# Patient Record
Sex: MALE | Race: WHITE | NOT HISPANIC OR LATINO | Employment: OTHER | URBAN - METROPOLITAN AREA
[De-identification: names, ages, dates, MRNs, and addresses within clinical notes are randomized per-mention and may not be internally consistent; named-entity substitution may affect disease eponyms.]

---

## 2023-08-26 PROBLEM — M06.9 RHEUMATOID ARTHRITIS (MULTI): Status: ACTIVE | Noted: 2023-08-26

## 2023-08-26 PROBLEM — I10 ESSENTIAL (PRIMARY) HYPERTENSION: Status: ACTIVE | Noted: 2023-08-26

## 2023-08-26 PROBLEM — M54.31 SCIATICA OF RIGHT SIDE: Status: ACTIVE | Noted: 2023-08-26

## 2023-08-26 PROBLEM — Z96.41 PRESENCE OF INSULIN PUMP (EXTERNAL) (INTERNAL): Status: ACTIVE | Noted: 2023-08-26

## 2023-08-26 PROBLEM — Z86.39 HISTORY OF DIABETES MELLITUS: Status: ACTIVE | Noted: 2023-08-26

## 2023-08-26 PROBLEM — E10.42 TYPE 1 DIABETES MELLITUS WITH DIABETIC POLYNEUROPATHY (MULTI): Status: ACTIVE | Noted: 2023-08-26

## 2023-08-26 PROBLEM — E78.2 MIXED HYPERLIPIDEMIA: Status: ACTIVE | Noted: 2023-08-26

## 2023-08-26 RX ORDER — LANCETS 26 GAUGE
EACH MISCELLANEOUS
COMMUNITY
Start: 2020-04-14

## 2023-08-26 RX ORDER — METOPROLOL TARTRATE 25 MG/1
1 TABLET, FILM COATED ORAL DAILY
COMMUNITY

## 2023-08-26 RX ORDER — INSULIN DEGLUDEC 100 U/ML
120 INJECTION, SOLUTION SUBCUTANEOUS DAILY
COMMUNITY
Start: 2023-02-17 | End: 2024-05-03 | Stop reason: SDUPTHER

## 2023-08-26 RX ORDER — ALLOPURINOL 300 MG/1
0.5 TABLET ORAL DAILY
COMMUNITY

## 2023-08-26 RX ORDER — INSULIN GLARGINE 100 [IU]/ML
INJECTION, SOLUTION SUBCUTANEOUS
COMMUNITY
End: 2023-10-27 | Stop reason: WASHOUT

## 2023-08-26 RX ORDER — METHOTREXATE 2.5 MG/1
10 TABLET ORAL
COMMUNITY

## 2023-08-26 RX ORDER — FOLIC ACID 20 MG
CAPSULE ORAL
COMMUNITY

## 2023-08-26 RX ORDER — INSULIN GLARGINE 100 [IU]/ML
60 INJECTION, SOLUTION SUBCUTANEOUS 2 TIMES DAILY
COMMUNITY
End: 2023-10-27 | Stop reason: WASHOUT

## 2023-08-26 RX ORDER — INSULIN LISPRO 100 [IU]/ML
INJECTION, SOLUTION INTRAVENOUS; SUBCUTANEOUS
COMMUNITY
End: 2024-03-14

## 2023-08-26 RX ORDER — BRIMONIDINE TARTRATE 1.5 MG/ML
1 SOLUTION/ DROPS OPHTHALMIC 2 TIMES DAILY
COMMUNITY

## 2023-08-26 RX ORDER — LATANOPROST 50 UG/ML
1 SOLUTION/ DROPS OPHTHALMIC NIGHTLY
COMMUNITY

## 2023-08-26 RX ORDER — BLOOD SUGAR DIAGNOSTIC
STRIP MISCELLANEOUS
COMMUNITY
End: 2024-01-17 | Stop reason: SDUPTHER

## 2023-08-26 RX ORDER — TOCILIZUMAB 180 MG/ML
INJECTION, SOLUTION SUBCUTANEOUS
COMMUNITY

## 2023-08-26 RX ORDER — FOLIC ACID 1 MG/1
1 TABLET ORAL DAILY
COMMUNITY

## 2023-10-01 DIAGNOSIS — E11.69 TYPE 2 DIABETES MELLITUS WITH OTHER SPECIFIED COMPLICATION, WITH LONG-TERM CURRENT USE OF INSULIN (MULTI): Primary | ICD-10-CM

## 2023-10-01 DIAGNOSIS — Z79.4 TYPE 2 DIABETES MELLITUS WITH OTHER SPECIFIED COMPLICATION, WITH LONG-TERM CURRENT USE OF INSULIN (MULTI): Primary | ICD-10-CM

## 2023-10-02 RX ORDER — PEN NEEDLE, DIABETIC 31 GX5/16"
NEEDLE, DISPOSABLE MISCELLANEOUS
Qty: 500 EACH | Refills: 1 | Status: SHIPPED | OUTPATIENT
Start: 2023-10-02 | End: 2024-03-29

## 2023-10-27 ENCOUNTER — CLINICAL SUPPORT (OUTPATIENT)
Dept: ENDOCRINOLOGY | Facility: CLINIC | Age: 68
End: 2023-10-27
Payer: MEDICARE

## 2023-10-27 VITALS
SYSTOLIC BLOOD PRESSURE: 133 MMHG | HEART RATE: 82 BPM | WEIGHT: 249.8 LBS | DIASTOLIC BLOOD PRESSURE: 88 MMHG | BODY MASS INDEX: 36.89 KG/M2

## 2023-10-27 DIAGNOSIS — I10 ESSENTIAL (PRIMARY) HYPERTENSION: ICD-10-CM

## 2023-10-27 DIAGNOSIS — E78.2 MIXED HYPERLIPIDEMIA: ICD-10-CM

## 2023-10-27 DIAGNOSIS — E10.42 TYPE 1 DIABETES MELLITUS WITH DIABETIC POLYNEUROPATHY (MULTI): Primary | ICD-10-CM

## 2023-10-27 LAB — POC HEMOGLOBIN A1C: 6.6 % (ref 4.2–6.5)

## 2023-10-27 PROCEDURE — 99214 OFFICE O/P EST MOD 30 MIN: CPT | Performed by: INTERNAL MEDICINE

## 2023-10-27 PROCEDURE — 83036 HEMOGLOBIN GLYCOSYLATED A1C: CPT | Performed by: INTERNAL MEDICINE

## 2023-10-27 PROCEDURE — 95251 CONT GLUC MNTR ANALYSIS I&R: CPT | Performed by: INTERNAL MEDICINE

## 2023-10-27 NOTE — PROGRESS NOTES
"69 yo with Diabetes 1 (dx 2003), HTN, dyslipidemia previously of Dr. Arroyo's practice. A1c was 6.7%, today 6.6%. Pt is testing sugars 4-5 times per day using dexcom. Pt is having low sugars 0-1 times/week. Pt has his dexcom with him today. Pt is following a carb controlled diet and knows reasonable carb allowances. Pt is able to afford their medications. Pt is not all that active.         Pt is taking tresiba 60 units bid and humalog 20-30 at meals (was 40u), ozempic 0.5mg weekly (started last visit, some gi sx - bloated/fullness following occ meal).          Pt taking toprol 25mg bid for htn.         Pt is not on a statin, states feeling achy/weak when tried in the past.         30 day dexcom data: 89% in range, 1% low, pattern: mid 100's through most of the day, occ excursions after meal.      Objective     /88   Pulse 82   Wt 113 kg (249 lb 12.8 oz)   BMI 36.89 kg/m²        Lab Results   Component Value Date    HGBA1C 6.6 (A) 10/27/2023       Current Outpatient Medications on File Prior to Visit   Medication Sig Dispense Refill    allopurinol (Zyloprim) 300 mg tablet 0.5 tablets (150 mg) once daily.      Autolet lancing device Use to test blood sugars 5 times per day. Long term current use of insulin Dx: E 10.42 for 90 days      BD Ultra-Fine Short Pen Needle 31 gauge x 5/16\" needle USE 5 TIMES A DAY AS DIRECTED 500 each 1    brimonidine (AlphaGAN P) 0.15 % ophthalmic solution Administer 1 drop into the right eye 2 times a day.      folic acid (Folvite) 1 mg tablet Take 1 tablet (1 mg) by mouth once daily.      folic acid 20 mg capsule Take by mouth.  Folic Acid CAPS Refills: 0 Active      insulin degludec (Tresiba FlexTouch U-100) 100 unit/mL (3 mL) injection Inject 120 Units under the skin once daily.      insulin lispro (HumaLOG KwikPen Insulin) 100 unit/mL injection INJECT UP  UNITS DAILY AS DIRECTED for 88      latanoprost (Xalatan) 0.005 % ophthalmic solution Administer 1 drop into both eyes " once daily at bedtime.      methotrexate (Trexall) 2.5 mg tablet Take 10 tablets (25 mg total) by mouth 1 (one) time per week.   ten tablets one time per week on Monday Orally as directed      metoprolol tartrate (Lopressor) 25 mg tablet Take 1 tablet (25 mg) by mouth once daily.      OneTouch Ultra Test strip USE TO TEST BLOOD SUGAR 5 TIMES DAILY (E10.65 IDDM) for 100      tocilizumab (Actemra) 0.9 ml Subcutaneous once every 10 days      [DISCONTINUED] insulin glargine (Lantus Solostar U-100 Insulin) 100 unit/mL (3 mL) pen Inject 60 Units under the skin 2 times a day.      [DISCONTINUED] insulin glargine (Lantus U-100 Insulin) 100 unit/mL injection Inject under the skin.  Lantus 100 UNIT/ML Subcutaneous       No current facility-administered medications on file prior to visit.      1. Type 1 diabetes mellitus with diabetic polyneuropathy (CMS/[facility])  Hesitant to uptitrate weekly glp1 given GI sx, can revisit future F/U   - reinforced eating slowly, stopping when full etc.   Doing well adjusting bolus insulin based on patterns/trends cgm data   - POCT glycosylated hemoglobin (Hb A1C) manually resulted    2. Essential (primary) hypertension  At target on therapy     3. Mixed hyperlipidemia  Update fasting labs prior to spring 2024 visit     Treatment and plan discussed with Dr. Shen.  LYDIA Honeycutt, PharmD, BC-ADM, CDCES.

## 2023-10-30 NOTE — PROGRESS NOTES
I, Dr Cristian Shen, have reviewed this progress note, medication list, vital signs, any pertinent lab values, and any CGM data if present with the [facility] and  face to face during this visit today. This note reflects the treatment plan that was made under my direction after reviewing the above mentioned elements while face to face with the patient and CDE.  I personally answered and addressed any questions and concerns the patient had during the visit today.  The CDE entered the data in this note under my direction and I personally reviewed it, signed any lab or medication orders that I instructed to be completed. I am the billing provider for this visit and the level of service was determined by my involvement in the Medical Decision Making Component of this visit while face to face with the patient.    Cristian Shen MD

## 2024-01-17 DIAGNOSIS — E10.42 TYPE 1 DIABETES MELLITUS WITH DIABETIC POLYNEUROPATHY (MULTI): Primary | ICD-10-CM

## 2024-01-17 RX ORDER — BLOOD SUGAR DIAGNOSTIC
STRIP MISCELLANEOUS
Qty: 500 STRIP | Refills: 1 | Status: SHIPPED | OUTPATIENT
Start: 2024-01-17 | End: 2024-06-06 | Stop reason: SDUPTHER

## 2024-02-10 DIAGNOSIS — E10.42 TYPE 1 DIABETES MELLITUS WITH DIABETIC POLYNEUROPATHY (MULTI): Primary | ICD-10-CM

## 2024-02-11 RX ORDER — SEMAGLUTIDE 0.68 MG/ML
INJECTION, SOLUTION SUBCUTANEOUS
Qty: 3 ML | Refills: 6 | Status: SHIPPED | OUTPATIENT
Start: 2024-02-11 | End: 2024-05-03 | Stop reason: SDUPTHER

## 2024-03-06 DIAGNOSIS — E10.42 TYPE 1 DIABETES MELLITUS WITH DIABETIC POLYNEUROPATHY (MULTI): Primary | ICD-10-CM

## 2024-03-07 RX ORDER — INSULIN GLARGINE 100 [IU]/ML
INJECTION, SOLUTION SUBCUTANEOUS
Qty: 105 ML | Refills: 6 | Status: SHIPPED | OUTPATIENT
Start: 2024-03-07

## 2024-03-14 DIAGNOSIS — E10.42 TYPE 1 DIABETES MELLITUS WITH DIABETIC POLYNEUROPATHY (MULTI): Primary | ICD-10-CM

## 2024-03-14 RX ORDER — INSULIN LISPRO 100 [IU]/ML
INJECTION, SOLUTION INTRAVENOUS; SUBCUTANEOUS
Qty: 150 ML | Refills: 1 | Status: SHIPPED | OUTPATIENT
Start: 2024-03-14 | End: 2024-05-03 | Stop reason: SDUPTHER

## 2024-03-29 DIAGNOSIS — E11.69 TYPE 2 DIABETES MELLITUS WITH OTHER SPECIFIED COMPLICATION, WITH LONG-TERM CURRENT USE OF INSULIN (MULTI): ICD-10-CM

## 2024-03-29 DIAGNOSIS — Z79.4 TYPE 2 DIABETES MELLITUS WITH OTHER SPECIFIED COMPLICATION, WITH LONG-TERM CURRENT USE OF INSULIN (MULTI): ICD-10-CM

## 2024-03-29 RX ORDER — PEN NEEDLE, DIABETIC 31 GX5/16"
NEEDLE, DISPOSABLE MISCELLANEOUS
Qty: 500 EACH | Refills: 3 | Status: SHIPPED | OUTPATIENT
Start: 2024-03-29

## 2024-04-02 ENCOUNTER — OFFICE VISIT (OUTPATIENT)
Dept: SURGICAL ONCOLOGY | Facility: CLINIC | Age: 69
End: 2024-04-02
Payer: COMMERCIAL

## 2024-04-02 ENCOUNTER — PREP FOR PROCEDURE (OUTPATIENT)
Dept: SURGICAL ONCOLOGY | Facility: CLINIC | Age: 69
End: 2024-04-02
Payer: MEDICARE

## 2024-04-02 ENCOUNTER — LAB (OUTPATIENT)
Dept: LAB | Facility: LAB | Age: 69
End: 2024-04-02
Payer: MEDICARE

## 2024-04-02 VITALS
HEART RATE: 87 BPM | DIASTOLIC BLOOD PRESSURE: 88 MMHG | SYSTOLIC BLOOD PRESSURE: 133 MMHG | TEMPERATURE: 97.3 F | WEIGHT: 250.22 LBS | BODY MASS INDEX: 36.95 KG/M2 | RESPIRATION RATE: 16 BRPM

## 2024-04-02 DIAGNOSIS — C43.72 MELANOMA OF FOOT, LEFT (MULTI): ICD-10-CM

## 2024-04-02 DIAGNOSIS — C43.72 MELANOMA OF FOOT, LEFT (MULTI): Primary | ICD-10-CM

## 2024-04-02 PROCEDURE — 99205 OFFICE O/P NEW HI 60 MIN: CPT | Performed by: SURGERY

## 2024-04-02 PROCEDURE — 3075F SYST BP GE 130 - 139MM HG: CPT | Performed by: SURGERY

## 2024-04-02 PROCEDURE — 1126F AMNT PAIN NOTED NONE PRSNT: CPT | Performed by: SURGERY

## 2024-04-02 PROCEDURE — 99215 OFFICE O/P EST HI 40 MIN: CPT | Mod: 57 | Performed by: SURGERY

## 2024-04-02 PROCEDURE — 85027 COMPLETE CBC AUTOMATED: CPT

## 2024-04-02 PROCEDURE — 80048 BASIC METABOLIC PNL TOTAL CA: CPT

## 2024-04-02 PROCEDURE — 1159F MED LIST DOCD IN RCRD: CPT | Performed by: SURGERY

## 2024-04-02 PROCEDURE — 3079F DIAST BP 80-89 MM HG: CPT | Performed by: SURGERY

## 2024-04-02 PROCEDURE — 36415 COLL VENOUS BLD VENIPUNCTURE: CPT

## 2024-04-02 RX ORDER — CEFAZOLIN SODIUM 2 G/100ML
2 INJECTION, SOLUTION INTRAVENOUS EVERY 8 HOURS
Status: CANCELLED | OUTPATIENT
Start: 2024-04-02

## 2024-04-02 ASSESSMENT — ENCOUNTER SYMPTOMS
DEPRESSION: 0
LOSS OF SENSATION IN FEET: 0
OCCASIONAL FEELINGS OF UNSTEADINESS: 0

## 2024-04-02 ASSESSMENT — PAIN SCALES - GENERAL: PAINLEVEL: 0-NO PAIN

## 2024-04-02 NOTE — PROGRESS NOTES
History and Physical    Referring Provider:  Antoni Stern DPM Wayne A Forde, MD    Chief Complaint:  Left foot melanoma    History of Present Illness:  This is a 69 y.o. 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 18 mitoses per mm2.  The patient had a lesion on his foot the was evaluated by podiatry and excised with a marginal excision demonstrating the melanoma.  He has no other lesions of concern but has not seen a dermatologist.    Past Medical History:  Diabetes-A1c 6.6  History of left knee meniscal tear and ligament repair  Peptic ulcer disease  Hyperlipidemia  Hypertension    Past Surgical History:  Past Surgical History:  03/02/2020: OTHER SURGICAL HISTORY      Comment:  Appendectomy  03/02/2020: OTHER SURGICAL HISTORY      Comment:  Knee surgery  03/02/2020: OTHER SURGICAL HISTORY      Comment:  Throat surgery  03/02/2020: OTHER SURGICAL HISTORY      Comment:  Leg surgery  03/02/2020: OTHER SURGICAL HISTORY      Comment:  Gallbladder surgery     Medications:  Current Outpatient Medications   Medication Instructions    allopurinol (Zyloprim) 300 mg tablet 0.5 tablets, Daily    Autolet lancing device Use to test blood sugars 5 times per day. Long term current use of insulin Dx: E 10.42 for 90 days<BR>    Basaglar KwikPen U-100 Insulin 100 unit/mL (3 mL) pen INJECT 60 UNITS UNDER THE SKIN TWO TIMES A DAY    brimonidine (AlphaGAN P) 0.15 % ophthalmic solution 1 drop, Right Eye, 2 times daily    folic acid (FOLVITE) 1 mg, oral, Daily    folic acid 20 mg capsule oral,  Folic Acid CAPS Refills: 0 Active    HumaLOG KwikPen Insulin 100 unit/mL injection INJECT UP  UNITS UNDER THE SKIN DAILY AS DIRECTED    insulin degludec (TRESIBA FLEXTOUCH U-100) 120 Units, subcutaneous, Daily    latanoprost (Xalatan) 0.005 % ophthalmic solution 1 drop, Both Eyes, Nightly    methotrexate (Trexall) 2.5 mg tablet 10 tablets, oral, Weekly,  ten tablets one time per week on  "Monday Orally as directed    metoprolol tartrate (LOPRESSOR) 25 mg, oral, Daily    OneTouch Ultra Test strip USE TO TEST BLOOD SUGAR 5 TIMES DAILY (E10.65 IDDM) for 100 ON INSULIN    Ozempic 0.25 mg or 0.5 mg (2 mg/3 mL) pen injector INJECT 0.5MG UNDER THE SKIN ONCE A WEEK    pen needle, diabetic (BD Ultra-Fine Short Pen Needle) 31 gauge x 5/16\" needle USE 5 TIMES A DAY AS DIRECTED    tocilizumab (Actemra)  0.9 ml Subcutaneous once every 10 days        Allergies:  Allergies   Allergen Reactions    Simvastatin Unknown        Family History:  family history includes Cancer in his mother; Heart attack in his father; blood cancer in his mother.     Social History:   Lives in [place].  With his wife.  No tobacco or drug use.  Rare alcohol use    Review of Systems:  A complete 12 point review of systems was performed and is negative except as noted in the history of present illness.    Vital Signs:  Vitals:    04/02/24 1506   BP: 133/88   Pulse: 87   Resp: 16   Temp: 36.3 °C (97.3 °F)        Physical Exam:  GEN: No acute distress, Healthy appearing  HEENT: Moist mucus membranes, normocephalic  CARDS: RRR  PULM: No respiratory distress  GI: Soft, non-distended.  Protuberant.  LYMPH: No palpable lymphadenopathy the left popliteal and inguinal basins.  SKIN: Left dorsal distal foot incision at the lateral aspect over top of the fifth metatarsal.  This is obliquely oriented.  No evidence for residual disease.  NEURO: No gross sensorimotor deficits  EXT: No arm or leg swelling    Laboratory Values:  Lab Results   Component Value Date    WBC 9.0 04/02/2024    HGB 15.2 04/02/2024    HCT 47.8 04/02/2024    MCV 97 04/02/2024     04/02/2024        Chemistry    Lab Results   Component Value Date/Time     04/02/2024 1737    K 4.3 04/02/2024 1737     04/02/2024 1737    CO2 26 04/02/2024 1737    BUN 17 04/02/2024 1737    CREATININE 1.08 04/02/2024 1737    Lab Results   Component Value Date/Time    CALCIUM " "10.3 04/02/2024 1737           No results found for: \"PR1\"        Assessment:  This is a 69 y.o. 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 18 mitoses per mm2.  The patient has no clinically palpable lymphadenopathy.    Plan:  -- The patient is recommended for a wide excision with a 1 to 2 cm margin and sentinel lymph node biopsy.  Skin graft will be required for closure.  Due to the location on the dorsal foot, a 1 cm margin would be likely to clear this disease and minimize the skin graft that would be needed.  -- Present in multidisciplinary cutaneous oncology tumor board  -- The patient understands the risks, benefits, and alternatives.  He is keenly aware of the difficulty of healing wounds on the foot especially with his history of diabetes however he does have his diabetes under good control.  -- Preop Labs and EKG  --The patient asked very appropriate questions that were answered to the best of my ability with the current information at hand. He knows to call with any questions or concerns that arise        Jethro Ba MD, MPH    "

## 2024-04-02 NOTE — H&P (VIEW-ONLY)
History and Physical    Referring Provider:  Antoni Stern DPM Wayne A Forde, MD    Chief Complaint:  Left foot melanoma    History of Present Illness:  This is a 69 y.o. 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 18 mitoses per mm2.  The patient had a lesion on his foot the was evaluated by podiatry and excised with a marginal excision demonstrating the melanoma.  He has no other lesions of concern but has not seen a dermatologist.    Past Medical History:  Diabetes-A1c 6.6  History of left knee meniscal tear and ligament repair  Peptic ulcer disease  Hyperlipidemia  Hypertension    Past Surgical History:  Past Surgical History:  03/02/2020: OTHER SURGICAL HISTORY      Comment:  Appendectomy  03/02/2020: OTHER SURGICAL HISTORY      Comment:  Knee surgery  03/02/2020: OTHER SURGICAL HISTORY      Comment:  Throat surgery  03/02/2020: OTHER SURGICAL HISTORY      Comment:  Leg surgery  03/02/2020: OTHER SURGICAL HISTORY      Comment:  Gallbladder surgery     Medications:  Current Outpatient Medications   Medication Instructions    allopurinol (Zyloprim) 300 mg tablet 0.5 tablets, Daily    Autolet lancing device Use to test blood sugars 5 times per day. Long term current use of insulin Dx: E 10.42 for 90 days<BR>    Basaglar KwikPen U-100 Insulin 100 unit/mL (3 mL) pen INJECT 60 UNITS UNDER THE SKIN TWO TIMES A DAY    brimonidine (AlphaGAN P) 0.15 % ophthalmic solution 1 drop, Right Eye, 2 times daily    folic acid (FOLVITE) 1 mg, oral, Daily    folic acid 20 mg capsule oral,  Folic Acid CAPS Refills: 0 Active    HumaLOG KwikPen Insulin 100 unit/mL injection INJECT UP  UNITS UNDER THE SKIN DAILY AS DIRECTED    insulin degludec (TRESIBA FLEXTOUCH U-100) 120 Units, subcutaneous, Daily    latanoprost (Xalatan) 0.005 % ophthalmic solution 1 drop, Both Eyes, Nightly    methotrexate (Trexall) 2.5 mg tablet 10 tablets, oral, Weekly,  ten tablets one time per week on  "Monday Orally as directed    metoprolol tartrate (LOPRESSOR) 25 mg, oral, Daily    OneTouch Ultra Test strip USE TO TEST BLOOD SUGAR 5 TIMES DAILY (E10.65 IDDM) for 100 ON INSULIN    Ozempic 0.25 mg or 0.5 mg (2 mg/3 mL) pen injector INJECT 0.5MG UNDER THE SKIN ONCE A WEEK    pen needle, diabetic (BD Ultra-Fine Short Pen Needle) 31 gauge x 5/16\" needle USE 5 TIMES A DAY AS DIRECTED    tocilizumab (Actemra)  0.9 ml Subcutaneous once every 10 days        Allergies:  Allergies   Allergen Reactions    Simvastatin Unknown        Family History:  family history includes Cancer in his mother; Heart attack in his father; blood cancer in his mother.     Social History:   Lives in [place].  With his wife.  No tobacco or drug use.  Rare alcohol use    Review of Systems:  A complete 12 point review of systems was performed and is negative except as noted in the history of present illness.    Vital Signs:  Vitals:    04/02/24 1506   BP: 133/88   Pulse: 87   Resp: 16   Temp: 36.3 °C (97.3 °F)        Physical Exam:  GEN: No acute distress, Healthy appearing  HEENT: Moist mucus membranes, normocephalic  CARDS: RRR  PULM: No respiratory distress  GI: Soft, non-distended.  Protuberant.  LYMPH: No palpable lymphadenopathy the left popliteal and inguinal basins.  SKIN: Left dorsal distal foot incision at the lateral aspect over top of the fifth metatarsal.  This is obliquely oriented.  No evidence for residual disease.  NEURO: No gross sensorimotor deficits  EXT: No arm or leg swelling    Laboratory Values:  Lab Results   Component Value Date    WBC 9.0 04/02/2024    HGB 15.2 04/02/2024    HCT 47.8 04/02/2024    MCV 97 04/02/2024     04/02/2024        Chemistry    Lab Results   Component Value Date/Time     04/02/2024 1737    K 4.3 04/02/2024 1737     04/02/2024 1737    CO2 26 04/02/2024 1737    BUN 17 04/02/2024 1737    CREATININE 1.08 04/02/2024 1737    Lab Results   Component Value Date/Time    CALCIUM " "10.3 04/02/2024 1737           No results found for: \"PR1\"        Assessment:  This is a 69 y.o. 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 18 mitoses per mm2.  The patient has no clinically palpable lymphadenopathy.    Plan:  -- The patient is recommended for a wide excision with a 1 to 2 cm margin and sentinel lymph node biopsy.  Skin graft will be required for closure.  Due to the location on the dorsal foot, a 1 cm margin would be likely to clear this disease and minimize the skin graft that would be needed.  -- Present in multidisciplinary cutaneous oncology tumor board  -- The patient understands the risks, benefits, and alternatives.  He is keenly aware of the difficulty of healing wounds on the foot especially with his history of diabetes however he does have his diabetes under good control.  -- Preop Labs and EKG  --The patient asked very appropriate questions that were answered to the best of my ability with the current information at hand. He knows to call with any questions or concerns that arise        Jethro Ba MD, MPH    "

## 2024-04-03 LAB
ANION GAP SERPL CALC-SCNC: 13 MMOL/L (ref 10–20)
BUN SERPL-MCNC: 17 MG/DL (ref 6–23)
CALCIUM SERPL-MCNC: 10.3 MG/DL (ref 8.6–10.6)
CHLORIDE SERPL-SCNC: 105 MMOL/L (ref 98–107)
CO2 SERPL-SCNC: 26 MMOL/L (ref 21–32)
CREAT SERPL-MCNC: 1.08 MG/DL (ref 0.5–1.3)
EGFRCR SERPLBLD CKD-EPI 2021: 74 ML/MIN/1.73M*2
ERYTHROCYTE [DISTWIDTH] IN BLOOD BY AUTOMATED COUNT: 14.3 % (ref 11.5–14.5)
GLUCOSE SERPL-MCNC: 112 MG/DL (ref 74–99)
HCT VFR BLD AUTO: 47.8 % (ref 41–52)
HGB BLD-MCNC: 15.2 G/DL (ref 13.5–17.5)
MCH RBC QN AUTO: 30.8 PG (ref 26–34)
MCHC RBC AUTO-ENTMCNC: 31.8 G/DL (ref 32–36)
MCV RBC AUTO: 97 FL (ref 80–100)
NRBC BLD-RTO: 0 /100 WBCS (ref 0–0)
PLATELET # BLD AUTO: 286 X10*3/UL (ref 150–450)
POTASSIUM SERPL-SCNC: 4.3 MMOL/L (ref 3.5–5.3)
RBC # BLD AUTO: 4.93 X10*6/UL (ref 4.5–5.9)
SODIUM SERPL-SCNC: 140 MMOL/L (ref 136–145)
WBC # BLD AUTO: 9 X10*3/UL (ref 4.4–11.3)

## 2024-04-16 ENCOUNTER — ANESTHESIA EVENT (OUTPATIENT)
Dept: OPERATING ROOM | Facility: HOSPITAL | Age: 69
End: 2024-04-16
Payer: MEDICARE

## 2024-04-17 ENCOUNTER — PHARMACY VISIT (OUTPATIENT)
Dept: PHARMACY | Facility: CLINIC | Age: 69
End: 2024-04-17
Payer: COMMERCIAL

## 2024-04-17 ENCOUNTER — ANESTHESIA (OUTPATIENT)
Dept: OPERATING ROOM | Facility: HOSPITAL | Age: 69
End: 2024-04-17
Payer: MEDICARE

## 2024-04-17 ENCOUNTER — LAB REQUISITION (OUTPATIENT)
Dept: LAB | Facility: HOSPITAL | Age: 69
End: 2024-04-17
Payer: COMMERCIAL

## 2024-04-17 ENCOUNTER — HOSPITAL ENCOUNTER (OUTPATIENT)
Facility: HOSPITAL | Age: 69
Setting detail: OUTPATIENT SURGERY
Discharge: HOME | End: 2024-04-17
Attending: SURGERY | Admitting: SURGERY
Payer: MEDICARE

## 2024-04-17 ENCOUNTER — LAB REQUISITION (OUTPATIENT)
Dept: LAB | Facility: HOSPITAL | Age: 69
End: 2024-04-17
Payer: MEDICARE

## 2024-04-17 ENCOUNTER — HOSPITAL ENCOUNTER (OUTPATIENT)
Dept: RADIOLOGY | Facility: HOSPITAL | Age: 69
Setting detail: OUTPATIENT SURGERY
Discharge: HOME | End: 2024-04-17
Payer: MEDICARE

## 2024-04-17 VITALS
BODY MASS INDEX: 36.57 KG/M2 | RESPIRATION RATE: 15 BRPM | HEIGHT: 69 IN | TEMPERATURE: 97.9 F | SYSTOLIC BLOOD PRESSURE: 138 MMHG | HEART RATE: 75 BPM | WEIGHT: 246.91 LBS | OXYGEN SATURATION: 97 % | DIASTOLIC BLOOD PRESSURE: 70 MMHG

## 2024-04-17 DIAGNOSIS — C43.72 MELANOMA OF FOOT, LEFT (MULTI): ICD-10-CM

## 2024-04-17 DIAGNOSIS — C43.72 MELANOMA OF FOOT, LEFT (MULTI): Primary | ICD-10-CM

## 2024-04-17 LAB — GLUCOSE BLD MANUAL STRIP-MCNC: 127 MG/DL (ref 74–99)

## 2024-04-17 PROCEDURE — 11606 EXC TR-EXT MAL+MARG >4 CM: CPT | Performed by: SURGERY

## 2024-04-17 PROCEDURE — 7100000001 HC RECOVERY ROOM TIME - INITIAL BASE CHARGE: Performed by: SURGERY

## 2024-04-17 PROCEDURE — 15240 FTH/GFT F/C/C/M/N/AX/G/H/F20: CPT | Performed by: SURGERY

## 2024-04-17 PROCEDURE — 3600000008 HC OR TIME - EACH INCREMENTAL 1 MINUTE - PROCEDURE LEVEL THREE: Performed by: SURGERY

## 2024-04-17 PROCEDURE — A15240 PR FULL THICK GRFT HEAD,FAC,HAND <20SQC: Performed by: NURSE ANESTHETIST, CERTIFIED REGISTERED

## 2024-04-17 PROCEDURE — 2720000007 HC OR 272 NO HCPCS: Performed by: SURGERY

## 2024-04-17 PROCEDURE — 7100000002 HC RECOVERY ROOM TIME - EACH INCREMENTAL 1 MINUTE: Performed by: SURGERY

## 2024-04-17 PROCEDURE — 3700000001 HC GENERAL ANESTHESIA TIME - INITIAL BASE CHARGE: Performed by: SURGERY

## 2024-04-17 PROCEDURE — 3430000001 HC RX 343 DIAGNOSTIC RADIOPHARMACEUTICALS: Performed by: SURGERY

## 2024-04-17 PROCEDURE — 78830 RP LOCLZJ TUM SPECT W/CT 1: CPT

## 2024-04-17 PROCEDURE — 7100000010 HC PHASE TWO TIME - EACH INCREMENTAL 1 MINUTE: Performed by: SURGERY

## 2024-04-17 PROCEDURE — 3700000002 HC GENERAL ANESTHESIA TIME - EACH INCREMENTAL 1 MINUTE: Performed by: SURGERY

## 2024-04-17 PROCEDURE — 82947 ASSAY GLUCOSE BLOOD QUANT: CPT

## 2024-04-17 PROCEDURE — 2500000005 HC RX 250 GENERAL PHARMACY W/O HCPCS: Performed by: SURGERY

## 2024-04-17 PROCEDURE — RXMED WILLOW AMBULATORY MEDICATION CHARGE

## 2024-04-17 PROCEDURE — 7100000009 HC PHASE TWO TIME - INITIAL BASE CHARGE: Performed by: SURGERY

## 2024-04-17 PROCEDURE — 2500000004 HC RX 250 GENERAL PHARMACY W/ HCPCS (ALT 636 FOR OP/ED): Mod: JZ | Performed by: SURGERY

## 2024-04-17 PROCEDURE — 38500 BIOPSY/REMOVAL LYMPH NODES: CPT | Performed by: SURGERY

## 2024-04-17 PROCEDURE — 3600000003 HC OR TIME - INITIAL BASE CHARGE - PROCEDURE LEVEL THREE: Performed by: SURGERY

## 2024-04-17 PROCEDURE — 2500000004 HC RX 250 GENERAL PHARMACY W/ HCPCS (ALT 636 FOR OP/ED): Performed by: NURSE ANESTHETIST, CERTIFIED REGISTERED

## 2024-04-17 PROCEDURE — A9520 TC99 TILMANOCEPT DIAG 0.5MCI: HCPCS | Performed by: SURGERY

## 2024-04-17 PROCEDURE — 38792 RA TRACER ID OF SENTINL NODE: CPT | Performed by: SURGERY

## 2024-04-17 PROCEDURE — 2500000005 HC RX 250 GENERAL PHARMACY W/O HCPCS: Performed by: NURSE ANESTHETIST, CERTIFIED REGISTERED

## 2024-04-17 PROCEDURE — 2500000001 HC RX 250 WO HCPCS SELF ADMINISTERED DRUGS (ALT 637 FOR MEDICARE OP): Performed by: SURGERY

## 2024-04-17 RX ORDER — PROPOFOL 10 MG/ML
INJECTION, EMULSION INTRAVENOUS AS NEEDED
Status: DISCONTINUED | OUTPATIENT
Start: 2024-04-17 | End: 2024-04-17

## 2024-04-17 RX ORDER — FENTANYL CITRATE 50 UG/ML
INJECTION, SOLUTION INTRAMUSCULAR; INTRAVENOUS AS NEEDED
Status: DISCONTINUED | OUTPATIENT
Start: 2024-04-17 | End: 2024-04-17

## 2024-04-17 RX ORDER — CEFAZOLIN SODIUM 2 G/100ML
2 INJECTION, SOLUTION INTRAVENOUS ONCE
Status: COMPLETED | OUTPATIENT
Start: 2024-04-17 | End: 2024-04-17

## 2024-04-17 RX ORDER — DROPERIDOL 2.5 MG/ML
0.62 INJECTION, SOLUTION INTRAMUSCULAR; INTRAVENOUS ONCE AS NEEDED
Status: DISCONTINUED | OUTPATIENT
Start: 2024-04-17 | End: 2024-04-17 | Stop reason: HOSPADM

## 2024-04-17 RX ORDER — SODIUM CHLORIDE, SODIUM LACTATE, POTASSIUM CHLORIDE, CALCIUM CHLORIDE 600; 310; 30; 20 MG/100ML; MG/100ML; MG/100ML; MG/100ML
100 INJECTION, SOLUTION INTRAVENOUS CONTINUOUS
Status: DISCONTINUED | OUTPATIENT
Start: 2024-04-17 | End: 2024-04-17 | Stop reason: HOSPADM

## 2024-04-17 RX ORDER — IBUPROFEN 600 MG/1
600 TABLET ORAL EVERY 6 HOURS PRN
Qty: 40 TABLET | Refills: 0 | Status: SHIPPED | OUTPATIENT
Start: 2024-04-17

## 2024-04-17 RX ORDER — ONDANSETRON HYDROCHLORIDE 2 MG/ML
INJECTION, SOLUTION INTRAVENOUS AS NEEDED
Status: DISCONTINUED | OUTPATIENT
Start: 2024-04-17 | End: 2024-04-17

## 2024-04-17 RX ORDER — TRAMADOL HYDROCHLORIDE 50 MG/1
50 TABLET ORAL EVERY 6 HOURS PRN
Qty: 12 TABLET | Refills: 0 | Status: SHIPPED | OUTPATIENT
Start: 2024-04-17

## 2024-04-17 RX ORDER — BACITRACIN ZINC 500 UNIT/G
OINTMENT IN PACKET (EA) TOPICAL AS NEEDED
Status: DISCONTINUED | OUTPATIENT
Start: 2024-04-17 | End: 2024-04-17 | Stop reason: HOSPADM

## 2024-04-17 RX ORDER — MIDAZOLAM HYDROCHLORIDE 1 MG/ML
INJECTION INTRAMUSCULAR; INTRAVENOUS AS NEEDED
Status: DISCONTINUED | OUTPATIENT
Start: 2024-04-17 | End: 2024-04-17

## 2024-04-17 RX ORDER — LIDOCAINE HCL/PF 100 MG/5ML
SYRINGE (ML) INTRAVENOUS AS NEEDED
Status: DISCONTINUED | OUTPATIENT
Start: 2024-04-17 | End: 2024-04-17

## 2024-04-17 RX ORDER — ACETAMINOPHEN 325 MG/1
650 TABLET ORAL EVERY 6 HOURS PRN
Qty: 40 TABLET | Refills: 1 | Status: SHIPPED | OUTPATIENT
Start: 2024-04-17

## 2024-04-17 RX ORDER — OXYCODONE HYDROCHLORIDE 5 MG/1
5 TABLET ORAL EVERY 4 HOURS PRN
Status: DISCONTINUED | OUTPATIENT
Start: 2024-04-17 | End: 2024-04-17 | Stop reason: HOSPADM

## 2024-04-17 RX ORDER — ONDANSETRON HYDROCHLORIDE 2 MG/ML
4 INJECTION, SOLUTION INTRAVENOUS ONCE AS NEEDED
Status: DISCONTINUED | OUTPATIENT
Start: 2024-04-17 | End: 2024-04-17 | Stop reason: HOSPADM

## 2024-04-17 RX ADMIN — TILMANOCEPT 0.59 MILLICURIE: KIT at 07:15

## 2024-04-17 RX ADMIN — ONDANSETRON 4 MG: 2 INJECTION, SOLUTION INTRAMUSCULAR; INTRAVENOUS at 11:04

## 2024-04-17 RX ADMIN — LIDOCAINE HYDROCHLORIDE 100 MG: 20 INJECTION, SOLUTION INTRAVENOUS at 09:50

## 2024-04-17 RX ADMIN — DEXAMETHASONE SODIUM PHOSPHATE 8 MG: 4 INJECTION INTRA-ARTICULAR; INTRALESIONAL; INTRAMUSCULAR; INTRAVENOUS; SOFT TISSUE at 11:08

## 2024-04-17 RX ADMIN — SODIUM CHLORIDE, SODIUM LACTATE, POTASSIUM CHLORIDE, AND CALCIUM CHLORIDE: 600; 310; 30; 20 INJECTION, SOLUTION INTRAVENOUS at 09:29

## 2024-04-17 RX ADMIN — FENTANYL CITRATE 50 MCG: 50 INJECTION, SOLUTION INTRAMUSCULAR; INTRAVENOUS at 10:57

## 2024-04-17 RX ADMIN — PROPOFOL 200 MG: 10 INJECTION, EMULSION INTRAVENOUS at 09:50

## 2024-04-17 RX ADMIN — CEFAZOLIN SODIUM 3 G: 2 INJECTION, SOLUTION INTRAVENOUS at 09:58

## 2024-04-17 RX ADMIN — MIDAZOLAM HYDROCHLORIDE 2 MG: 1 INJECTION, SOLUTION INTRAMUSCULAR; INTRAVENOUS at 09:50

## 2024-04-17 RX ADMIN — FENTANYL CITRATE 100 MCG: 50 INJECTION, SOLUTION INTRAMUSCULAR; INTRAVENOUS at 09:50

## 2024-04-17 RX ADMIN — FENTANYL CITRATE 50 MCG: 50 INJECTION, SOLUTION INTRAMUSCULAR; INTRAVENOUS at 11:42

## 2024-04-17 SDOH — HEALTH STABILITY: MENTAL HEALTH: CURRENT SMOKER: 0

## 2024-04-17 ASSESSMENT — PAIN - FUNCTIONAL ASSESSMENT
PAIN_FUNCTIONAL_ASSESSMENT: 0-10

## 2024-04-17 ASSESSMENT — PAIN SCALES - GENERAL
PAINLEVEL_OUTOF10: 1
PAINLEVEL_OUTOF10: 1
PAIN_LEVEL: 2
PAINLEVEL_OUTOF10: 1
PAINLEVEL_OUTOF10: 0 - NO PAIN
PAINLEVEL_OUTOF10: 1

## 2024-04-17 NOTE — ANESTHESIA POSTPROCEDURE EVALUATION
Patient: Marguerite Carrillo    Procedure Summary       Date: 04/17/24 Room / Location: [facility] OR 03 / [facility] OR    Anesthesia Start: 0943 Anesthesia Stop: 1216    Procedures:       *7AM INJECTION* APPLICATION FULL THICKNESS SKIN GRAFT LOWER EXTREMITY (Left)      WIDE EXCISION LEFT DORSAL FOOT MELANOMA (Left: Foot)      BIOPSY SENTINEL LYMPH NODE INGUINAL (Left: Groin) Diagnosis:       Melanoma of foot, left (Multi)      (Melanoma of foot, left (CMS/HCC) [C43.72])    Surgeons: Jethro Ba MD MPH Responsible Provider: ANNETTE Blake    Anesthesia Type: general ASA Status: 4            Anesthesia Type: general    Vitals Value Taken Time   /72 04/17/24 1225   Temp 36.6 °C (97.9 °F) 04/17/24 1208   Pulse 85 04/17/24 1225   Resp 16 04/17/24 1225   SpO2 96 % 04/17/24 1225       Anesthesia Post Evaluation    Patient location during evaluation: PACU  Patient participation: complete - patient participated  Level of consciousness: awake  Pain score: 2  Pain management: adequate  Multimodal analgesia pain management approach  Airway patency: patent  Two or more strategies used to mitigate risk of obstructive sleep apnea  Cardiovascular status: acceptable  Respiratory status: acceptable  Hydration status: acceptable  Postoperative Nausea and Vomiting: none    No notable events documented.

## 2024-04-17 NOTE — OP NOTE
*7AM INJECTION* APPLICATION FULL THICKNESS SKIN GRAFT LOWER EXTREMITY (L), WIDE EXCISION LEFT DORSAL FOOT MELANOMA (L), BIOPSY SENTINEL LYMPH NODE INGUINAL (L), BIOPSY POPLITEAL SENTINEL LYMPH NODE LOWER EXTREMITY (L) Operative Note     Date: 2024  OR Location: [facility] OR    Name: Marguerite Carrillo, : 1955, Age: 69 y.o., MRN: [id number], Sex: male    Diagnosis  Pre-op Diagnosis     * Melanoma of foot, left (Multi) [C43.72] Post-op Diagnosis     * Melanoma of foot, left (Multi) [C43.72]     Procedures  *7AM INJECTION* APPLICATION FULL THICKNESS SKIN GRAFT LOWER EXTREMITY  89282 - MO FTH/GF FR W/DIR CLSR F/C/C/M/N/AX/G/H/F 20SQCM/<    WIDE EXCISION LEFT DORSAL FOOT MELANOMA  80771 - MO EXCISION MALIGNANT LESION TRUNK/ARM/LEG > 4.0 CM    BIOPSY SENTINEL LYMPH NODE INGUINAL  13213 - MO BX/EXC LYMPH NODE OPEN SUPERFICIAL    MO INJ RADIOACTIVE TRACER FOR ID OF SENTINEL NODE [82141]    Surgeons      * Jethro Ba - Primary    Resident/Fellow/Other Assistant:  Surgeons and Role:  * No surgeons found with a matching role *    Procedure Summary  Anesthesia: Consult  ASA: ASA status not filed in the log.  Anesthesia Staff: CRNA: DANK Blake-CRNA  Estimated Blood Loss: 5mL  Intra-op Medications: Administrations occurring from 0930 to 1200 on 24:  * No intraprocedure medications in log *           Anesthesia Record               Intraprocedure I/O Totals       None           Specimen: No specimens collected     Staff:   Circulator: Aruna Alston RN  Relief Circulator: Grace Mariee RN  Scrub Person: Nikki Choudhury  RNFA: Priyanka Sanders RN      Findings: Left dorsal distal lateral foot incision from prior biopsy.  Lymphoscintigraphy demonstrating drainage to the left inguinal basin only    Indications: Marguerite Carrillo is an 69 y.o. male who is having surgery for Melanoma of foot, left (CMS/HCC) [C43.72].  This patient had a left dorsal distal lateral foot melanoma that was 1.8 mm Breslow depth and ulcerated  with 18 mitoses per millimeter squared.  The patient had no clinically palpable lymphadenopathy.  He was recommended for wide excision of the primary lesion with a 1 to 2 cm margin and sentinel lymph node biopsy.  Due to the location of the lesion on the distal foot near the toes, a 2 cm margin would be very challenging and therefore a 1 cm margin was recommended.  The patient understood the risks, benefits, and alternatives.    The patient was seen in the preoperative area. The risks, benefits, complications, treatment options, non-operative alternatives, expected recovery and outcomes were discussed with the patient. The possibilities of reaction to medication, pulmonary aspiration, injury to surrounding structures, bleeding, recurrent infection, the need for additional procedures, failure to diagnose a condition, and creating a complication requiring transfusion or operation were discussed with the patient. The patient concurred with the proposed plan, giving informed consent.  The site of surgery was properly noted/marked if necessary per policy. The patient has been actively warmed in preoperative area. Preoperative antibiotics have been ordered and given within 1 hours of incision. Venous thrombosis prophylaxis have been ordered including unilateral sequential compression device    Procedure Details: The patient was brought to the OR and placed on the OR table in supine position. General anesthesia was induced and an LMA was placed without difficulty. The patient was positioned supine and the surgical sites were prepped and draped in sterile fashion.    Attention was turned to the left inguinal basin.  The area of highest gamma signal was noted inferior to the inguinal crease.  As the skin marking for the dorsal foot excision was 2.2 x 4.8 cm after drawing a 1 cm margin, a 2.3 x 7.5 cm ellipse was drawn to incorporate the incision for the sentinel node biopsy.  An incision was made sharply through the dermis  of the skin and the skin was harvested as a full-thickness skin graft.  Extra fat was defatted used Metzenbaum scissors.  This was also pie crusted in a few places to improve drainage.  This graft was then kept hydrated with saline.  Attention was turned to the left inguinal arleen basin.  An incision was made through the subcutaneous tissues using cautery and the sentinel lymph nodes were dissected using clips and cautery.  2 sentinel nodes were identified using the gamma probe and excised. Once all sentinel nodes were excised, the basin was irrigated and hemostasis was assured. The wound was then closed in layers with 3-0 vicryl for the subcutaneous and deep dermal layers, and 4-0 monocryl for the skin. Skin glue was used as a dressing.     At this point, the primary melanoma on the left dorsal distal lateral foot was addressed. A 1cm margin had been marked around the biopsy site measuring 2.2 x 4.8 cm. The skin was incised sharply and dissection carried to but not through the underlying muscular fascia. The specimen was marked with a short stitch proximally at 12:00 and a long stitch laterally at 3:00.  The wound was irrigated and hemostasis was obtained.  The full-thickness skin graft was then sutured into place using 3-0 chromic sutures taking care to tack down areas of the graft to the underlying tissue.  A Xeroform bolster was then placed over a thin layer bacitracin and seem to achieve excellent approximation of the graft to the underlying tissues.  The bolster was then covered with a 4 x 4 gauze and wrapped with Kerlix and an Ace bandage.  The patient was placed into a surgical boot.    Complications:  None; patient tolerated the procedure well.    Disposition: PACU - hemodynamically stable.  Condition: stable     Wide Local Excision for Primary Cutaneous Melanoma  Operation performed with curative intent Yes   Original Breslow thickness of the lesion 1.8 mm (to the tenth of a millimeter)   Clinical margin  width 1 cm   Depth of excision Full-thickness skin/subcutaneous tissue down to fascia (melanoma)       Attending Attestation: I performed the procedure.    Jethro Ba  Phone Number: [phone]

## 2024-04-17 NOTE — ANESTHESIA PROCEDURE NOTES
Airway  Date/Time: 4/17/2024 9:52 AM  Urgency: emergent    Airway not difficult    Staffing  Performed: CRNA   Authorized by: ANNETTE Blake    Performed by: ANNETTE Blake  Patient location during procedure: OR    Consent for Airway (if performed for an anesthetic, see related documentation for consents)  Consent: No emergent situation. Verbal consent obtained. Written consent obtained.  Risks and benefits: risks, benefits and alternatives were discussed  Consent given by: patient      Indications and Patient Condition  Indications for airway management: anesthesia and airway protection  Spontaneous ventilation: present  Sedation level: deep  Preoxygenated: yes  Patient position: sniffing  MILS maintained throughout  Mask difficulty assessment: 0 - not attempted  No planned trial extubation    Final Airway Details  Final airway type: supraglottic airway      Successful airway: Size 5     Number of attempts at approach: 1

## 2024-04-17 NOTE — ANESTHESIA PREPROCEDURE EVALUATION
Patient: Marguerite Carrillo    Procedure Information       Date/Time: 04/17/24 0930    Procedures:       *7AM INJECTION* APPLICATION FULL THICKNESS SKIN GRAFT LOWER EXTREMITY (Left)      WIDE EXCISION LEFT DORSAL FOOT MELANOMA (Left)      BIOPSY SENTINEL LYMPH NODE INGUINAL (Left)      BIOPSY POPLITEAL SENTINEL LYMPH NODE LOWER EXTREMITY (Left)    Location: [facility] / [facility]    Surgeons: Jethro Ba MD MPH            Relevant Problems   Cardiac   (+) Essential (primary) hypertension   (+) Mixed hyperlipidemia      Neuro   (+) Sciatica of right side      Endocrine   (+) Type 1 diabetes mellitus with diabetic polyneuropathy (Multi)      Musculoskeletal   (+) Rheumatoid arthritis (Multi)       Clinical information reviewed:                   NPO Detail:  No data recorded     Physical Exam    Airway  Mallampati: II  TM distance: >3 FB  Neck ROM: full     Cardiovascular - normal exam     Dental    Pulmonary - normal exam     Abdominal - normal exam           Anesthesia Plan    History of general anesthesia?: yes  History of complications of general anesthesia?: no    ASA 4     general     The patient is not a current smoker.  Patient was not previously instructed to abstain from smoking on day of procedure.  Patient did not smoke on day of procedure.    intravenous induction   Postoperative administration of opioids is intended.  Anesthetic plan and risks discussed with patient.  Use of blood products discussed with patient who consented to blood products.    Plan discussed with CRNA.

## 2024-04-18 ENCOUNTER — LAB REQUISITION (OUTPATIENT)
Dept: DERMATOPATHOLOGY | Facility: CLINIC | Age: 69
End: 2024-04-18
Payer: MEDICARE

## 2024-04-18 DIAGNOSIS — L98.9 DISORDER OF THE SKIN AND SUBCUTANEOUS TISSUE, UNSPECIFIED: ICD-10-CM

## 2024-04-18 PROCEDURE — 88321 CONSLTJ&REPRT SLD PREP ELSWR: CPT | Performed by: DERMATOLOGY

## 2024-04-22 LAB
PATH REPORT.FINAL DX SPEC: NORMAL
PATH REPORT.GROSS SPEC: NORMAL
PATH REPORT.RELEVANT HX SPEC: NORMAL
PATH REPORT.TOTAL CANCER: NORMAL
PATHOLOGY SYNOPTIC REPORT: NORMAL

## 2024-04-23 ENCOUNTER — TELEPHONE (OUTPATIENT)
Dept: HOME HEALTH SERVICES | Facility: HOME HEALTH | Age: 69
End: 2024-04-23

## 2024-04-23 ENCOUNTER — OFFICE VISIT (OUTPATIENT)
Dept: SURGICAL ONCOLOGY | Facility: CLINIC | Age: 69
End: 2024-04-23
Payer: MEDICARE

## 2024-04-23 VITALS
BODY MASS INDEX: 36.3 KG/M2 | SYSTOLIC BLOOD PRESSURE: 133 MMHG | DIASTOLIC BLOOD PRESSURE: 85 MMHG | HEART RATE: 90 BPM | RESPIRATION RATE: 18 BRPM | TEMPERATURE: 97.7 F | WEIGHT: 245.81 LBS | OXYGEN SATURATION: 94 %

## 2024-04-23 DIAGNOSIS — C43.72 MELANOMA OF FOOT, LEFT (MULTI): Primary | ICD-10-CM

## 2024-04-23 DIAGNOSIS — Z94.5 STATUS POST FULL THICKNESS SKIN GRAFT: ICD-10-CM

## 2024-04-23 PROCEDURE — 99024 POSTOP FOLLOW-UP VISIT: CPT | Performed by: SURGERY

## 2024-04-23 PROCEDURE — 3079F DIAST BP 80-89 MM HG: CPT | Performed by: SURGERY

## 2024-04-23 PROCEDURE — 1159F MED LIST DOCD IN RCRD: CPT | Performed by: SURGERY

## 2024-04-23 PROCEDURE — 1126F AMNT PAIN NOTED NONE PRSNT: CPT | Performed by: SURGERY

## 2024-04-23 PROCEDURE — 3075F SYST BP GE 130 - 139MM HG: CPT | Performed by: SURGERY

## 2024-04-23 ASSESSMENT — ENCOUNTER SYMPTOMS
DEPRESSION: 0
OCCASIONAL FEELINGS OF UNSTEADINESS: 0
LOSS OF SENSATION IN FEET: 0

## 2024-04-23 ASSESSMENT — PAIN SCALES - GENERAL: PAINLEVEL: 0-NO PAIN

## 2024-04-23 NOTE — TELEPHONE ENCOUNTER
Hello  home care has received the referral placed for this pt. Please complete today's f2f visit note. Once complete, we can review for acceptance. Thank you   
Clear

## 2024-04-23 NOTE — PROGRESS NOTES
Postoperative Visit    Referring Provider:  No ref. provider found   Leonardo Ruffin MD    Chief Complaint:  Left foot melanoma    History of Present Illness:  This is a 69 y.o. 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 18 mitoses per mm2.  The patient had a lesion on his foot the was evaluated by podiatry and excised with a marginal excision demonstrating the melanoma.  He has no other lesions of concern but has not seen a dermatologist.    Surgery 4/17/2024 - Wide excision left foot melanoma with 1cm margin, full thickness skin graft, and left inguinal sentinel lymph node biopsy.   Pathology -PENDING    4/23/2024 - POV, Graft check. The patient has been wearing surgical shoe since the operation.  His leg is warm and wrapped.  He had soreness and swelling in the groin that has improved.  He had no concern for leg swelling currently.      Review of Systems:  A complete 12 point review of systems was performed and is negative except as noted in the history of present illness.    Vital Signs:  Vitals:    04/23/24 1208   BP: 133/85   Pulse: 90   Resp: 18   Temp: 36.5 °C (97.7 °F)   SpO2: 94%        Physical Exam:  GEN: No acute distress, Healthy appearing  HEENT: Moist mucus membranes, normocephalic  CARDS: RRR  PULM: No respiratory distress  LYMPH: Left inguinal incision is well-approximated with skin glue.  No evidence of rash or yeast infection in the groin.  No breakdown.  Minimal swelling with minimal tenderness  SKIN: Left dorsal distal foot skin graft is well-approximated to the underlying base.  There is no evidence of undrained fluid collections.  The graft was cleansed with saline and redressed with a Xeroform bolster and wrapped with gauze and an Ace bandage.  The surgical shoe was replaced.  The graft itself measures 2.2 x 4 cm  NEURO: No gross sensorimotor deficits  EXT: No significant Home care ordered for arm or leg swelling    Laboratory Values:  Lab  "Results   Component Value Date    WBC 9.0 04/02/2024    HGB 15.2 04/02/2024    HCT 47.8 04/02/2024    MCV 97 04/02/2024     04/02/2024        Chemistry    Lab Results   Component Value Date/Time     04/02/2024 1737    K 4.3 04/02/2024 1737     04/02/2024 1737    CO2 26 04/02/2024 1737    BUN 17 04/02/2024 1737    CREATININE 1.08 04/02/2024 1737    Lab Results   Component Value Date/Time    CALCIUM 10.3 04/02/2024 1737           No results found for: \"PR1\"        Assessment:  This is a 69 y.o. 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 18 mitoses per mm2.  The patient has no clinically palpable lymphadenopathy.    Surgery 4/17/2024 - Wide excision left foot melanoma with 1cm margin, full thickness skin graft, and left inguinal sentinel lymph node biopsy.   Pathology -PENDING    Plan:  --Xeroform bolster dressing changes every 2 to 3 days.  The patient family is unable to assist with this.  Continue to wear the surgical boot  -- Follow-up in 1 week for graft check and discussion of pathology  --The patient asked very appropriate questions that were answered to the best of my ability with the current information at hand. He knows to call with any questions or concerns that arise        Jethro Ba MD, MPH    "

## 2024-04-24 ENCOUNTER — HOME HEALTH ADMISSION (OUTPATIENT)
Dept: HOME HEALTH SERVICES | Facility: HOME HEALTH | Age: 69
End: 2024-04-24
Payer: MEDICARE

## 2024-04-24 ENCOUNTER — DOCUMENTATION (OUTPATIENT)
Dept: HOME HEALTH SERVICES | Facility: HOME HEALTH | Age: 69
End: 2024-04-24
Payer: MEDICARE

## 2024-04-24 NOTE — HH CARE COORDINATION
Home Care received a Referral for Nursing. We have processed the referral for a Start of Care on 4/25/2024.     If you have any questions or concerns, please feel free to contact us at [phone]. Follow the prompts, enter your five digit zip code, and you will be directed to your care team on [facility].

## 2024-04-25 ENCOUNTER — HOME CARE VISIT (OUTPATIENT)
Dept: HOME HEALTH SERVICES | Facility: HOME HEALTH | Age: 69
End: 2024-04-25
Payer: MEDICARE

## 2024-04-25 VITALS
HEART RATE: 78 BPM | SYSTOLIC BLOOD PRESSURE: 130 MMHG | OXYGEN SATURATION: 98 % | RESPIRATION RATE: 20 BRPM | DIASTOLIC BLOOD PRESSURE: 87 MMHG | TEMPERATURE: 98.7 F

## 2024-04-25 LAB
LAB AP ASR DISCLAIMER: NORMAL
LABORATORY COMMENT REPORT: NORMAL
PATH REPORT.FINAL DX SPEC: NORMAL
PATH REPORT.GROSS SPEC: NORMAL
PATH REPORT.MICROSCOPIC SPEC OTHER STN: NORMAL
PATH REPORT.RELEVANT HX SPEC: NORMAL
PATH REPORT.TOTAL CANCER: NORMAL

## 2024-04-25 PROCEDURE — G0299 HHS/HOSPICE OF RN EA 15 MIN: HCPCS | Mod: HHH

## 2024-04-25 PROCEDURE — 0023 HH SOC

## 2024-04-25 PROCEDURE — 1090000001 HH PPS REVENUE CREDIT

## 2024-04-25 PROCEDURE — 169592 NO-PAY CLAIM PROCEDURE

## 2024-04-25 PROCEDURE — 1090000002 HH PPS REVENUE DEBIT

## 2024-04-25 ASSESSMENT — ACTIVITIES OF DAILY LIVING (ADL): ENTERING_EXITING_HOME: NEEDS ASSISTANCE

## 2024-04-25 ASSESSMENT — ENCOUNTER SYMPTOMS
PAIN LOCATION: LEFT FOOT
PAIN: 1
PERSON REPORTING PAIN: PATIENT

## 2024-04-26 DIAGNOSIS — C43.9 MELANOMA OF SKIN (MULTI): Primary | ICD-10-CM

## 2024-04-26 PROCEDURE — 1090000001 HH PPS REVENUE CREDIT

## 2024-04-26 PROCEDURE — 1090000002 HH PPS REVENUE DEBIT

## 2024-04-26 NOTE — TUMOR BOARD NOTE
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  S/p WLE with 1 cm margins showing an atypical melanocytic hyperplasia suggestive of residual melanoma in situ, present on the peripheral margin, the 9:00 to 10:00 margin appears to be involved, and benign SLNB (0/2).    Recommendation:  Re-excision of the 9-10 o'clock margin with 5 mm margins.    Review Multidisciplinary Cutaneous Oncology Conference recommendation with patient.  Continue routine follow up and total body skin exams with Dermatology.    Follow Up:  Jethro Ba, Dermatology      History and Physical Exam  Dermatologic History:   69 y.o. male with a biopsy of the left foot on 3- showing an ulcerated melanoma, acral type, Breslow: 1.9 mm, with neurotropism.  S/p on 4- WLE with 1 cm margins showing an atypical melanocytic hyperplasia suggestive of residual melanoma in situ, present on the peripheral margin, the 9:00 to 10:00 margin appears to be involved, and left inguinal SLNB with focal Melan-A staining, benign node favored (0/2).    Past Medical History:    Past Medical History:   Diagnosis Date    Class 2 obesity with body mass index (BMI) of 36.0 to 36.9 in adult 07/26/2023    Diabetic neuropathy     HLD (hyperlipidemia)     HTN (hypertension)     Insulin pump in place     Melanoma of foot, left     Rheumatoid arthritis     Sciatica, right side     Type 1 diabetes        Family History of DNS/MM:  Unknown    Skin:  Left dorsal distal foot incision at the lateral aspect over top of the fifth metatarsal.  This is obliquely oriented.  No evidence for residual  "disease.    Lymphatic:  No palpable lymphadenopathy the left popliteal and inguinal basins.      Pathology  Dermatopathology- DERM LAB: [id number]  Collected 4/17/2024 10:20    A. SKIN, LEFT FOOT MELANOMA, SHORT PROXIMAL 12:00, LONG LATERAL 3:00, WIDE EXCISION:  ATYPICAL MELANOCYTIC HYPERPLASIA SUGGESTIVE OF RESIDUAL MELANOMA IN SITU, PRESENT ON THE PERIPHERAL MARGIN, SEE NOTE.     Note: Microscopic examination reveals a specimen that extends into the subcutaneous fat. An area with horizontally oriented collagen and vertically oriented vessels is present. In slides A3 and A4 there are increased single melanocytes along the dermal-epidermal junction seen on a SOX-10. These melanocytes stain with antibodies against PRAME. There is no significant pagetoid extension and the melanocytes have mildly enlarged nuclei. All control slides stain appropriately.     The 9:00 to 10:00 margin appears to be involved.      B. NODE, LEFT INGUINAL SENTINEL LYMPH NODE #1 COUNT 237, BIOPSY:  FOCAL MELAN-A STAINING, SEE NOTE.     Note: Microscopic examination reveals an enlarged lymph node. In slide B1 in the subcapsular space there is focal Melan-A staining in two separate sections that is not seen on the SOX-10 or HMB45 stain. One area appears to be artifact. The other has \"U\" shaped staining around a nucleus. The primary melanoma in [id number] was reviewed and a benign lymph node is favored.  This case was reviewed with Dr. Lilly Dasilva who concurs with the diagnosis.     C. NODE, LEFT INGUINAL SENTINEL LYMPH NODE #2 COUNT 63, BIOPSY:  BENIGN LYMPH NODE.     ** Electronically signed out by Mauricio Mtz MD **    C. Microscopic examination reveals a lymph node. No melanoma is seen on H&E. Melan A, Sox10, and HMB45 stains are unremarkable. All control slides stain appropriately.    _____________________________________________________________________________________________________________    Derm Consult: [id number]  4 SLIDES, [facility]" [facility], #[id number] (BX: 03/14/2024)      SKIN, LEFT FOOT, EXCISION:  FOCALLY ULCERATED MALIGNANT MELANOMA, BRESLOW THICKNESS 1.9 MM, SEE NOTE.     Note: Microscopic examination reveals a specimen that extends into the subcutaneous fat. In slides A3 and A4 there is a fairy well circumscribed proliferation of nested and single atypical melanocytes throughout all layers of the epidermis and within the dermis. There appears to be focal neurotropism. The melanocytes have moderately enlarged nuclei with moderate cytoplasm.      ** Electronically signed out by Mauricio Mtz MD **    Procedure  Excision   Specimen Laterality  Left     Tumor Site  Skin of lower limb and hip: Left foot        Histologic Type  Acral melanoma   Maximum Tumor (Breslow) Thickness (Millimeters)  1.9 mm   Ulceration  Present   Extent of Ulceration (Millimeters)  0.1 mm   Anatomic (Fredy) Level  IV (melanoma invades reticular dermis)   Mitotic Rate  5 mitoses per mm2   Microsatellite(s)  Not identified   Lymphovascular Invasion  Not identified   Neurotropism  Present   Tumor-Infiltrating Lymphocytes  Present, nonbrisk   Tumor Regression  Not identified   MARGINS     Margin Status for Invasive Melanoma  All margins negative for invasive melanoma   Margin Status for Melanoma in situ  All margins negative for melanoma in situ   Margin Comment  Invasive melanoma and melanoma in situ are free of the margins in planes of sections examined.   PATHOLOGIC STAGE CLASSIFICATION (pTNM, AJCC 8th Edition)     pT Category  pT2b

## 2024-04-27 ENCOUNTER — HOME CARE VISIT (OUTPATIENT)
Dept: HOME HEALTH SERVICES | Facility: HOME HEALTH | Age: 69
End: 2024-04-27
Payer: MEDICARE

## 2024-04-27 VITALS
DIASTOLIC BLOOD PRESSURE: 84 MMHG | OXYGEN SATURATION: 96 % | SYSTOLIC BLOOD PRESSURE: 132 MMHG | RESPIRATION RATE: 16 BRPM | HEART RATE: 84 BPM

## 2024-04-27 PROCEDURE — 1090000002 HH PPS REVENUE DEBIT

## 2024-04-27 PROCEDURE — 1090000001 HH PPS REVENUE CREDIT

## 2024-04-27 PROCEDURE — G0300 HHS/HOSPICE OF LPN EA 15 MIN: HCPCS | Mod: HHH

## 2024-04-28 PROCEDURE — 1090000001 HH PPS REVENUE CREDIT

## 2024-04-28 PROCEDURE — 1090000002 HH PPS REVENUE DEBIT

## 2024-04-28 SDOH — ECONOMIC STABILITY: FOOD INSECURITY: MEALS PER DAY: 3

## 2024-04-28 ASSESSMENT — ENCOUNTER SYMPTOMS
PAIN LOCATION - PAIN SEVERITY: 4/10
PAIN LOCATION - PAIN QUALITY: TENDER
APPETITE LEVEL: GOOD
HIGHEST PAIN SEVERITY IN PAST 24 HOURS: 4/10
PAIN SEVERITY GOAL: 0/10
CHANGE IN APPETITE: UNCHANGED
PAIN LOCATION - PAIN FREQUENCY: INTERMITTENT
MUSCLE WEAKNESS: 1
LAST BOWEL MOVEMENT: 66955
LOWEST PAIN SEVERITY IN PAST 24 HOURS: 3/10
SUBJECTIVE PAIN PROGRESSION: UNCHANGED

## 2024-04-28 ASSESSMENT — LIFESTYLE VARIABLES: SMOKING_STATUS: 0

## 2024-04-28 ASSESSMENT — ACTIVITIES OF DAILY LIVING (ADL)
CURRENT_FUNCTION: STAND BY ASSIST
AMBULATION ASSISTANCE: STAND BY ASSIST
OASIS_M1830: 03

## 2024-04-29 ENCOUNTER — HOME CARE VISIT (OUTPATIENT)
Dept: HOME HEALTH SERVICES | Facility: HOME HEALTH | Age: 69
End: 2024-04-29
Payer: MEDICARE

## 2024-04-29 ENCOUNTER — TUMOR BOARD CONFERENCE (OUTPATIENT)
Dept: HEMATOLOGY/ONCOLOGY | Facility: HOSPITAL | Age: 69
End: 2024-04-29
Payer: MEDICARE

## 2024-04-29 PROCEDURE — 1090000001 HH PPS REVENUE CREDIT

## 2024-04-29 PROCEDURE — 1090000002 HH PPS REVENUE DEBIT

## 2024-04-30 ENCOUNTER — OFFICE VISIT (OUTPATIENT)
Dept: SURGICAL ONCOLOGY | Facility: CLINIC | Age: 69
End: 2024-04-30
Payer: MEDICARE

## 2024-04-30 VITALS
WEIGHT: 246.91 LBS | BODY MASS INDEX: 36.46 KG/M2 | DIASTOLIC BLOOD PRESSURE: 90 MMHG | SYSTOLIC BLOOD PRESSURE: 132 MMHG | RESPIRATION RATE: 18 BRPM | OXYGEN SATURATION: 93 % | TEMPERATURE: 97.5 F | HEART RATE: 82 BPM

## 2024-04-30 DIAGNOSIS — Z94.5 STATUS POST FULL THICKNESS SKIN GRAFT: Primary | ICD-10-CM

## 2024-04-30 DIAGNOSIS — C43.72 MELANOMA OF FOOT, LEFT (MULTI): ICD-10-CM

## 2024-04-30 PROCEDURE — 3075F SYST BP GE 130 - 139MM HG: CPT | Performed by: SURGERY

## 2024-04-30 PROCEDURE — 1125F AMNT PAIN NOTED PAIN PRSNT: CPT | Performed by: SURGERY

## 2024-04-30 PROCEDURE — 3080F DIAST BP >= 90 MM HG: CPT | Performed by: SURGERY

## 2024-04-30 PROCEDURE — 99213 OFFICE O/P EST LOW 20 MIN: CPT | Mod: 24 | Performed by: SURGERY

## 2024-04-30 PROCEDURE — 1090000002 HH PPS REVENUE DEBIT

## 2024-04-30 PROCEDURE — 1090000001 HH PPS REVENUE CREDIT

## 2024-04-30 PROCEDURE — 1159F MED LIST DOCD IN RCRD: CPT | Performed by: SURGERY

## 2024-04-30 PROCEDURE — 99213 OFFICE O/P EST LOW 20 MIN: CPT | Performed by: SURGERY

## 2024-04-30 ASSESSMENT — ENCOUNTER SYMPTOMS
DEPRESSION: 0
APPETITE LEVEL: GOOD
CHANGE IN APPETITE: UNCHANGED
OCCASIONAL FEELINGS OF UNSTEADINESS: 0
LOSS OF SENSATION IN FEET: 0

## 2024-04-30 ASSESSMENT — PAIN SCALES - GENERAL: PAINLEVEL: 8

## 2024-04-30 NOTE — PROGRESS NOTES
Graft Check    Referring Provider:  No ref. provider found   Leonardo Ruffin MD    Chief Complaint:  Left foot melanoma    History of Present Illness:  This is a 69 y.o. 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 18 mitoses per mm2.  The patient had a lesion on his foot the was evaluated by podiatry and excised with a marginal excision demonstrating the melanoma.  He has no other lesions of concern but has not seen a dermatologist.    Surgery 4/17/2024 - Wide excision left foot melanoma with 1cm margin, full thickness skin graft, and left inguinal sentinel lymph node biopsy.   Pathology - Residual AMH suggestive of MIS at the 9-10 o'clock margin. 0/2 LN involved.    4/30/2024 - Graft check. The patient has been wearing surgical shoe since the operation.  His leg is warm and wrapped.  Home care helping with dressing changes.  He had no concern for leg swelling currently.      Review of Systems:  A complete 12 point review of systems was performed and is negative except as noted in the history of present illness.    Vital Signs:  Vitals:    04/30/24 1411   BP: 132/90   Pulse: 82   Resp: 18   Temp: 36.4 °C (97.5 °F)   SpO2: 93%        Physical Exam:  GEN: No acute distress, Healthy appearing  HEENT: Moist mucus membranes, normocephalic  CARDS: RRR  PULM: No respiratory distress  LYMPH: Left inguinal incision is well-approximated with skin glue.  No evidence of rash or yeast infection in the groin.  No breakdown.    SKIN: Left dorsal distal foot skin graft is well-approximated to the underlying base. Superficial blistering under the proximal aspect of the graft. Epidermolysis.  Redressed with a Xeroform bolster and wrapped with gauze and an Ace bandage.  The surgical shoe was replaced.  The graft itself measures 2.2 x 4 cm  NEURO: No gross sensorimotor deficits  EXT: No significant arm or leg swelling    Laboratory Values:  Lab Results   Component Value Date    WBC 9.0  "04/02/2024    HGB 15.2 04/02/2024    HCT 47.8 04/02/2024    MCV 97 04/02/2024     04/02/2024        Chemistry    Lab Results   Component Value Date/Time     04/02/2024 1737    K 4.3 04/02/2024 1737     04/02/2024 1737    CO2 26 04/02/2024 1737    BUN 17 04/02/2024 1737    CREATININE 1.08 04/02/2024 1737    Lab Results   Component Value Date/Time    CALCIUM 10.3 04/02/2024 1737           No results found for: \"PR1\"    Assessment:  This is a 69 y.o. 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 18 mitoses per mm2.  The patient has no clinically palpable lymphadenopathy.    Surgery 4/17/2024 - Wide excision left foot melanoma with 1cm margin, full thickness skin graft, and left inguinal sentinel lymph node biopsy.   Pathology - Residual AMH suggestive of MIS at the 9-10 o'clock margin. 0/2 LN involved.    Plan:  --Xeroform bolster dressing changes every 2 to 3 days.  The patient family is unable to assist with this.  Continue to wear the surgical boot  -- Follow-up in 1 week for graft check  -- Pathology suggestive of MIS at the margin (9:00-10:00). Recommended for re-excision. This will be performed after the graft heals and the inflammation subsides.  --The patient asked very appropriate questions that were answered to the best of my ability with the current information at hand. He knows to call with any questions or concerns that arise        Jethro Ba MD, MPH    "

## 2024-05-01 ENCOUNTER — HOME CARE VISIT (OUTPATIENT)
Dept: HOME HEALTH SERVICES | Facility: HOME HEALTH | Age: 69
End: 2024-05-01
Payer: MEDICARE

## 2024-05-01 VITALS — DIASTOLIC BLOOD PRESSURE: 102 MMHG | SYSTOLIC BLOOD PRESSURE: 161 MMHG | TEMPERATURE: 97 F | HEART RATE: 79 BPM

## 2024-05-01 PROCEDURE — 1090000002 HH PPS REVENUE DEBIT

## 2024-05-01 PROCEDURE — 1090000001 HH PPS REVENUE CREDIT

## 2024-05-01 PROCEDURE — G0300 HHS/HOSPICE OF LPN EA 15 MIN: HCPCS | Mod: HHH

## 2024-05-01 ASSESSMENT — ENCOUNTER SYMPTOMS
APPETITE LEVEL: GOOD
OCCASIONAL FEELINGS OF UNSTEADINESS: 0
LOWEST PAIN SEVERITY IN PAST 24 HOURS: 7/10
PAIN LOCATION: LEFT FOOT
CHANGE IN APPETITE: UNCHANGED
LAST BOWEL MOVEMENT: 66959
PERSON REPORTING PAIN: PATIENT
HIGHEST PAIN SEVERITY IN PAST 24 HOURS: 8/10
HYPERTENSION: 1
PAIN: 1
PAIN SEVERITY GOAL: 0/10
SUBJECTIVE PAIN PROGRESSION: GRADUALLY IMPROVING

## 2024-05-01 NOTE — PROGRESS NOTES
HPI   68 yo with Diabetes 1 (dx 2003), HTN, dyslipidemia, melanoma L foot in 2024 previously of Dr. Arroyo's practice. A1c was 6.7%, today 6.3%.     Pt is testing sugars 4-5 times per day using dexcom. Pt is having low sugars 0-1 times/week. Pt has his dexcom with him today. Pt is following a carb controlled diet and knows reasonable carb allowances. Pt is able to afford their medications. Pt is not all that active.           Pt is taking tresiba 60 units bid and humalog 40 at meals, 15 units with snack. Ozempic 0.5mg weekly (added last visit, had gi effects for some time after starting).    14 days dexcom data: 89% in range, 0% low, pattern: mid 100's through the day for the most part.           Pt taking toprol 25mg bid for htn.           Pt is not on a statin, states feeling achy/weak when tried in the past.           30 day dexcom data: 83% in range, 1% low, pattern: mid 100's through most of the day, occ excursions after meal           Pt considerably late to visit today as he was last visit.  Will not be seen her next time if this behavior continues.      Current Outpatient Medications:     acetaminophen (Tylenol) 325 mg tablet, Take 2 tablets (650 mg) by mouth every 6 hours if needed for mild pain (1 - 3), Disp: 40 tablet, Rfl: 1    allopurinol (Zyloprim) 300 mg tablet, Take 0.5 tablets (150 mg) by mouth once daily., Disp: , Rfl:     Autolet lancing device, Use to test blood sugars 5 times per day. Long term current use of insulin Dx: E 10.42 for 90 days , Disp: , Rfl:     Basaglar KwikPen U-100 Insulin 100 unit/mL (3 mL) pen, INJECT 60 UNITS UNDER THE SKIN TWO TIMES A DAY, Disp: 105 mL, Rfl: 6    brimonidine (AlphaGAN P) 0.15 % ophthalmic solution, Administer 1 drop into the right eye 2 times a day., Disp: , Rfl:     folic acid (Folvite) 1 mg tablet, Take 1 tablet (1 mg) by mouth once daily., Disp: , Rfl:     folic acid 20 mg capsule, Take by mouth.  Folic Acid CAPS Refills: 0 Active, Disp: , Rfl:      "HumaLOG KwikPen Insulin 100 unit/mL injection, INJECT UP  UNITS UNDER THE SKIN DAILY AS DIRECTED, Disp: 150 mL, Rfl: 1    ibuprofen 600 mg tablet, Take 1 tablet (600 mg) by mouth every 6 hours if needed for moderate pain (4 - 6), Disp: 40 tablet, Rfl: 0    insulin degludec (Tresiba FlexTouch U-100) 100 unit/mL (3 mL) injection, Inject 120 Units under the skin once daily., Disp: , Rfl:     latanoprost (Xalatan) 0.005 % ophthalmic solution, Administer 1 drop into both eyes once daily at bedtime., Disp: , Rfl:     methotrexate (Trexall) 2.5 mg tablet, Take 10 tablets (25 mg total) by mouth 1 (one) time per week.   ten tablets one time per week on Monday Orally as directed, Disp: , Rfl:     metoprolol tartrate (Lopressor) 25 mg tablet, Take 1 tablet (25 mg) by mouth once daily., Disp: , Rfl:     OneTouch Ultra Test strip, USE TO TEST BLOOD SUGAR 5 TIMES DAILY (E10.65 IDDM) for 100 ON INSULIN, Disp: 500 strip, Rfl: 1    Ozempic 0.25 mg or 0.5 mg (2 mg/3 mL) pen injector, INJECT 0.5MG UNDER THE SKIN ONCE A WEEK, Disp: 3 mL, Rfl: 6    pen needle, diabetic (BD Ultra-Fine Short Pen Needle) 31 gauge x 5/16\" needle, USE 5 TIMES A DAY AS DIRECTED, Disp: 500 each, Rfl: 3    spironolactone (Aldactone) 25 mg tablet, Take 1 tablet (25 mg) by mouth once daily., Disp: , Rfl:     tocilizumab (Actemra),  0.9 ml Subcutaneous once every 10 days, Disp: , Rfl:     traMADol (Ultram) 50 mg tablet, Take 1 tablet (50 mg) by mouth every 6 hours if needed for severe pain (7 - 10) for up to 12 doses., Disp: 12 tablet, Rfl: 0      Allergies as of 05/03/2024 - Reviewed 05/03/2024   Allergen Reaction Noted    Lisinopril Unknown 06/22/2015    Simvastatin Unknown 08/26/2023         Review of Systems   Cardiology: Lightheadedness-denies.  Chest pain-denies.  Leg edema-denies.  Palpitations-denies.  Respiratory: Cough-denies. Shortness of breath-denies.  Wheezing-denies.  Gastroenterology: Constipation-at times.  Diarrhea-denies.  " Heartburn-denies.  Endocrinology: Cold intolerance-denies.  Heat intolerance-denies.  Sweats-denies.  Neurology: Headache-denies.  Tremor-denies.  Neuropathy in extremities-denies.  Psychology: Low energy-denies.  Irritability-denies.  Sleep disturbances-denies.      /68 (BP Location: Right arm, Patient Position: Sitting)   Pulse 83   Wt 111 kg (245 lb)   BMI 36.18 kg/m²       Labs:  Lab Results   Component Value Date    WBC 9.0 04/02/2024    NRBC 0.0 04/02/2024    RBC 4.93 04/02/2024    HGB 15.2 04/02/2024    HCT 47.8 04/02/2024     04/02/2024     Lab Results   Component Value Date    CALCIUM 10.3 04/02/2024     04/02/2024    K 4.3 04/02/2024     04/02/2024    CO2 26 04/02/2024    ANIONGAP 13 04/02/2024    BUN 17 04/02/2024    CREATININE 1.08 04/02/2024    GLUCOSE 112 (H) 04/02/2024    EGFR 74 04/02/2024       Lab Results   Component Value Date    HGBA1C 6.3 05/03/2024         Physical Exam   General Appearance: pleasant, cooperative, no acute distress  HEENT: no chemosis, no proptosis, no lid lag, no lid retraction  Neck: no lymphadenopathy, no thyromegaly, no dominant thyroid nodules  Heart: no murmurs, regular rate and rhythm, S1 and S2  Lungs: no wheezes, no rhonci, no rales  Extremities: no lower extremity swelling      Assessment/Plan   1. Type 1 diabetes mellitus with diabetic polyneuropathy (Multi)  -A1c ordered and reviewed  -labs reviewed  -refills sent  -dexcom data reviewed    -overall great control, doing well and losing wt with ozempic  -no dose increase given gi effects, consider miralax if constipation    2. Essential (primary) hypertension  -at target on therapy, no change, will follow    3. Mixed hyperlipidemia  -on statin, labs have been ordered, would like to see ldl<70         Follow Up:  August Vanegas 6 months    Medical Decision Making  Complexity of problem: Chronic illness of diabetes mellitus uncontrolled, progressing  Data analyzed and reviewed: Reviewed prior  notes, blood glucose data, labs including HgbA1c, lipids, serum chemistries.  Ordered tests.   Risk of complications and morbidities: Is definite because of use of insulin and risk of hypoglycemia.  Prescription medications reviewed and modifications made.  Compliance assessed.  Addressed social determinants of health including food insecurity.

## 2024-05-02 PROCEDURE — 1090000001 HH PPS REVENUE CREDIT

## 2024-05-02 PROCEDURE — 1090000002 HH PPS REVENUE DEBIT

## 2024-05-03 ENCOUNTER — OFFICE VISIT (OUTPATIENT)
Dept: ENDOCRINOLOGY | Facility: CLINIC | Age: 69
End: 2024-05-03
Payer: MEDICARE

## 2024-05-03 ENCOUNTER — HOME CARE VISIT (OUTPATIENT)
Dept: HOME HEALTH SERVICES | Facility: HOME HEALTH | Age: 69
End: 2024-05-03
Payer: MEDICARE

## 2024-05-03 VITALS
WEIGHT: 245 LBS | DIASTOLIC BLOOD PRESSURE: 68 MMHG | HEART RATE: 83 BPM | BODY MASS INDEX: 36.18 KG/M2 | SYSTOLIC BLOOD PRESSURE: 126 MMHG

## 2024-05-03 VITALS — HEART RATE: 88 BPM | SYSTOLIC BLOOD PRESSURE: 144 MMHG | DIASTOLIC BLOOD PRESSURE: 88 MMHG | TEMPERATURE: 97 F

## 2024-05-03 DIAGNOSIS — I10 ESSENTIAL (PRIMARY) HYPERTENSION: ICD-10-CM

## 2024-05-03 DIAGNOSIS — E78.2 MIXED HYPERLIPIDEMIA: ICD-10-CM

## 2024-05-03 DIAGNOSIS — E10.42 TYPE 1 DIABETES MELLITUS WITH DIABETIC POLYNEUROPATHY (MULTI): Primary | ICD-10-CM

## 2024-05-03 LAB — POC HEMOGLOBIN A1C: 6.3 % (ref 4.2–6.5)

## 2024-05-03 PROCEDURE — 83036 HEMOGLOBIN GLYCOSYLATED A1C: CPT | Performed by: INTERNAL MEDICINE

## 2024-05-03 PROCEDURE — 99214 OFFICE O/P EST MOD 30 MIN: CPT | Performed by: INTERNAL MEDICINE

## 2024-05-03 PROCEDURE — G0300 HHS/HOSPICE OF LPN EA 15 MIN: HCPCS | Mod: HHH

## 2024-05-03 PROCEDURE — 3078F DIAST BP <80 MM HG: CPT | Performed by: INTERNAL MEDICINE

## 2024-05-03 PROCEDURE — 1125F AMNT PAIN NOTED PAIN PRSNT: CPT | Performed by: INTERNAL MEDICINE

## 2024-05-03 PROCEDURE — 1090000001 HH PPS REVENUE CREDIT

## 2024-05-03 PROCEDURE — 1036F TOBACCO NON-USER: CPT | Performed by: INTERNAL MEDICINE

## 2024-05-03 PROCEDURE — 3074F SYST BP LT 130 MM HG: CPT | Performed by: INTERNAL MEDICINE

## 2024-05-03 PROCEDURE — 1090000002 HH PPS REVENUE DEBIT

## 2024-05-03 PROCEDURE — 1159F MED LIST DOCD IN RCRD: CPT | Performed by: INTERNAL MEDICINE

## 2024-05-03 RX ORDER — INSULIN LISPRO 100 [IU]/ML
INJECTION, SOLUTION INTRAVENOUS; SUBCUTANEOUS
Qty: 150 ML | Refills: 1 | Status: SHIPPED | OUTPATIENT
Start: 2024-05-03

## 2024-05-03 RX ORDER — SEMAGLUTIDE 0.68 MG/ML
0.5 INJECTION, SOLUTION SUBCUTANEOUS
Qty: 9 ML | Refills: 1 | Status: SHIPPED | OUTPATIENT
Start: 2024-05-05

## 2024-05-03 RX ORDER — INSULIN DEGLUDEC 100 U/ML
INJECTION, SOLUTION SUBCUTANEOUS
Qty: 120 ML | Refills: 1 | Status: SHIPPED | OUTPATIENT
Start: 2024-05-03

## 2024-05-03 ASSESSMENT — ENCOUNTER SYMPTOMS
HIGHEST PAIN SEVERITY IN PAST 24 HOURS: 9/10
PAIN SEVERITY GOAL: 0/10
CHANGE IN APPETITE: UNCHANGED
PAIN LOCATION: LEFT FOOT
HYPOTENSION: 1
OCCASIONAL FEELINGS OF UNSTEADINESS: 0
MUSCLE WEAKNESS: 1
APPETITE LEVEL: GOOD
PAIN: 1
LOWEST PAIN SEVERITY IN PAST 24 HOURS: 9/10
SUBJECTIVE PAIN PROGRESSION: UNCHANGED

## 2024-05-03 ASSESSMENT — PATIENT HEALTH QUESTIONNAIRE - PHQ9
SUM OF ALL RESPONSES TO PHQ9 QUESTIONS 1 & 2: 0
2. FEELING DOWN, DEPRESSED OR HOPELESS: NOT AT ALL
1. LITTLE INTEREST OR PLEASURE IN DOING THINGS: NOT AT ALL

## 2024-05-03 ASSESSMENT — PAIN SCALES - GENERAL: PAINLEVEL: 10-WORST PAIN EVER

## 2024-05-03 ASSESSMENT — ACTIVITIES OF DAILY LIVING (ADL): MONEY MANAGEMENT (EXPENSES/BILLS): INDEPENDENT

## 2024-05-04 PROCEDURE — 1090000001 HH PPS REVENUE CREDIT

## 2024-05-04 PROCEDURE — 1090000002 HH PPS REVENUE DEBIT

## 2024-05-05 PROCEDURE — 1090000002 HH PPS REVENUE DEBIT

## 2024-05-05 PROCEDURE — 1090000001 HH PPS REVENUE CREDIT

## 2024-05-06 ENCOUNTER — HOME CARE VISIT (OUTPATIENT)
Dept: HOME HEALTH SERVICES | Facility: HOME HEALTH | Age: 69
End: 2024-05-06
Payer: MEDICARE

## 2024-05-06 VITALS — TEMPERATURE: 98.4 F | DIASTOLIC BLOOD PRESSURE: 107 MMHG | SYSTOLIC BLOOD PRESSURE: 161 MMHG

## 2024-05-06 PROCEDURE — 1090000001 HH PPS REVENUE CREDIT

## 2024-05-06 PROCEDURE — G0300 HHS/HOSPICE OF LPN EA 15 MIN: HCPCS | Mod: HHH

## 2024-05-06 PROCEDURE — 1090000002 HH PPS REVENUE DEBIT

## 2024-05-06 ASSESSMENT — ENCOUNTER SYMPTOMS
PAIN: 1
LOWEST PAIN SEVERITY IN PAST 24 HOURS: 7/10
CHANGE IN APPETITE: UNCHANGED
HYPERTENSION: 1
APPETITE LEVEL: GOOD
PAIN SEVERITY GOAL: 0/10
HIGHEST PAIN SEVERITY IN PAST 24 HOURS: 7/10
BOWEL PATTERN NORMAL: 1
LAST BOWEL MOVEMENT: 66965
PAIN LOCATION: LEFT FOOT
SUBJECTIVE PAIN PROGRESSION: UNCHANGED
PERSON REPORTING PAIN: PATIENT

## 2024-05-06 ASSESSMENT — ACTIVITIES OF DAILY LIVING (ADL): MONEY MANAGEMENT (EXPENSES/BILLS): INDEPENDENT

## 2024-05-07 ENCOUNTER — OFFICE VISIT (OUTPATIENT)
Dept: SURGICAL ONCOLOGY | Facility: CLINIC | Age: 69
End: 2024-05-07
Payer: MEDICARE

## 2024-05-07 VITALS
DIASTOLIC BLOOD PRESSURE: 89 MMHG | SYSTOLIC BLOOD PRESSURE: 134 MMHG | RESPIRATION RATE: 18 BRPM | HEART RATE: 95 BPM | TEMPERATURE: 97.5 F | OXYGEN SATURATION: 92 %

## 2024-05-07 DIAGNOSIS — Z94.5 STATUS POST FULL THICKNESS SKIN GRAFT: Primary | ICD-10-CM

## 2024-05-07 PROCEDURE — 3079F DIAST BP 80-89 MM HG: CPT | Performed by: SURGERY

## 2024-05-07 PROCEDURE — G0180 MD CERTIFICATION HHA PATIENT: HCPCS | Performed by: SURGERY

## 2024-05-07 PROCEDURE — 1090000002 HH PPS REVENUE DEBIT

## 2024-05-07 PROCEDURE — 1125F AMNT PAIN NOTED PAIN PRSNT: CPT | Performed by: SURGERY

## 2024-05-07 PROCEDURE — 3075F SYST BP GE 130 - 139MM HG: CPT | Performed by: SURGERY

## 2024-05-07 PROCEDURE — 1159F MED LIST DOCD IN RCRD: CPT | Performed by: SURGERY

## 2024-05-07 PROCEDURE — 99212 OFFICE O/P EST SF 10 MIN: CPT | Performed by: SURGERY

## 2024-05-07 PROCEDURE — 99024 POSTOP FOLLOW-UP VISIT: CPT | Performed by: SURGERY

## 2024-05-07 PROCEDURE — 1090000001 HH PPS REVENUE CREDIT

## 2024-05-07 ASSESSMENT — COLUMBIA-SUICIDE SEVERITY RATING SCALE - C-SSRS
1. IN THE PAST MONTH, HAVE YOU WISHED YOU WERE DEAD OR WISHED YOU COULD GO TO SLEEP AND NOT WAKE UP?: NO
6. HAVE YOU EVER DONE ANYTHING, STARTED TO DO ANYTHING, OR PREPARED TO DO ANYTHING TO END YOUR LIFE?: NO
2. HAVE YOU ACTUALLY HAD ANY THOUGHTS OF KILLING YOURSELF?: NO

## 2024-05-07 ASSESSMENT — PATIENT HEALTH QUESTIONNAIRE - PHQ9
2. FEELING DOWN, DEPRESSED OR HOPELESS: NOT AT ALL
1. LITTLE INTEREST OR PLEASURE IN DOING THINGS: NOT AT ALL
SUM OF ALL RESPONSES TO PHQ9 QUESTIONS 1 AND 2: 0

## 2024-05-07 ASSESSMENT — ENCOUNTER SYMPTOMS
OCCASIONAL FEELINGS OF UNSTEADINESS: 0
DEPRESSION: 0
LOSS OF SENSATION IN FEET: 0

## 2024-05-07 ASSESSMENT — PAIN SCALES - GENERAL: PAINLEVEL: 8

## 2024-05-08 ENCOUNTER — HOME CARE VISIT (OUTPATIENT)
Dept: HOME HEALTH SERVICES | Facility: HOME HEALTH | Age: 69
End: 2024-05-08
Payer: MEDICARE

## 2024-05-08 VITALS
DIASTOLIC BLOOD PRESSURE: 70 MMHG | TEMPERATURE: 97.6 F | RESPIRATION RATE: 20 BRPM | HEART RATE: 78 BPM | SYSTOLIC BLOOD PRESSURE: 130 MMHG | OXYGEN SATURATION: 98 %

## 2024-05-08 PROCEDURE — G0299 HHS/HOSPICE OF RN EA 15 MIN: HCPCS | Mod: HHH

## 2024-05-08 PROCEDURE — 1090000002 HH PPS REVENUE DEBIT

## 2024-05-08 PROCEDURE — 1090000001 HH PPS REVENUE CREDIT

## 2024-05-08 NOTE — PROGRESS NOTES
Graft Check    Referring Provider:  Dr. Jarred Ruffin MD    Chief Complaint:  Left foot melanoma    History of Present Illness:  This is a 69 y.o. 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 18 mitoses per mm2.  The patient had a lesion on his foot the was evaluated by podiatry and excised with a marginal excision demonstrating the melanoma.  He has no other lesions of concern but has not seen a dermatologist.    Surgery 4/17/2024 - Wide excision left foot melanoma with 1cm margin, full thickness skin graft, and left inguinal sentinel lymph node biopsy.   Pathology - Residual AMH suggestive of MIS at the 9-10 o'clock margin. 0/2 LN involved.    5/7/2024 - Graft check. The patient has been wearing surgical shoe since the operation.  Overall doing well.  Home care helping with dressing changes.  He had no concern for leg swelling currently. Some soreness around the graft      Review of Systems:  A complete 12 point review of systems was performed and is negative except as noted in the history of present illness.    Vital Signs:  Vitals:    05/07/24 1435   BP: 134/89   Pulse: 95   Resp: 18   Temp: 36.4 °C (97.5 °F)   SpO2: 92%        Physical Exam:  GEN: No acute distress, Healthy appearing  HEENT: Moist mucus membranes, normocephalic  CARDS: RRR  PULM: No respiratory distress  LYMPH: Left inguinal incision is well-approximated with skin glue.  No seroma  SKIN: Left dorsal distal foot skin graft is well-approximated to the underlying base. Superficial scabbing gently debrided. Redressed with a Xeroform bolster and wrapped with gauze and an Ace bandage.  The surgical shoe was replaced. Surrounding skin pink discoloration at the medial margin  NEURO: No gross sensorimotor deficits  EXT: No significant arm or leg swelling    Laboratory Values:  Lab Results   Component Value Date    WBC 9.0 04/02/2024    HGB 15.2 04/02/2024    HCT 47.8 04/02/2024    MCV 97 04/02/2024  "    04/02/2024        Chemistry    Lab Results   Component Value Date/Time     04/02/2024 1737    K 4.3 04/02/2024 1737     04/02/2024 1737    CO2 26 04/02/2024 1737    BUN 17 04/02/2024 1737    CREATININE 1.08 04/02/2024 1737    Lab Results   Component Value Date/Time    CALCIUM 10.3 04/02/2024 1737           No results found for: \"PR1\"    Assessment:  This is a 69 y.o. 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 18 mitoses per mm2.  The patient has no clinically palpable lymphadenopathy.    Surgery 4/17/2024 - Wide excision left foot melanoma with 1cm margin, full thickness skin graft, and left inguinal sentinel lymph node biopsy.   Pathology - Residual AMH suggestive of MIS at the 9-10 o'clock margin. 0/2 LN involved.    Plan:  --Xeroform bolster dressing changes every 2 to 3 days. Continue to wear the surgical boot  -- Follow-up in 1 week for graft check to ensure that cellulitis does not develop.  -- Pathology suggestive of MIS at the margin (9:00-10:00). Recommended for re-excision. This will be performed after the graft heals and the inflammation subsides. Will consider Mohs per patient discussion  --The patient asked very appropriate questions that were answered to the best of my ability with the current information at hand. He knows to call with any questions or concerns that arise        Jethro Ba MD, MPH    "

## 2024-05-09 DIAGNOSIS — M05.79 RHEUMATOID ARTHRITIS INVOLVING MULTIPLE SITES WITH POSITIVE RHEUMATOID FACTOR (MULTI): Primary | ICD-10-CM

## 2024-05-09 PROCEDURE — 1090000001 HH PPS REVENUE CREDIT

## 2024-05-09 PROCEDURE — 1090000002 HH PPS REVENUE DEBIT

## 2024-05-10 ENCOUNTER — HOME CARE VISIT (OUTPATIENT)
Dept: HOME HEALTH SERVICES | Facility: HOME HEALTH | Age: 69
End: 2024-05-10
Payer: MEDICARE

## 2024-05-10 PROCEDURE — 1090000002 HH PPS REVENUE DEBIT

## 2024-05-10 PROCEDURE — G0300 HHS/HOSPICE OF LPN EA 15 MIN: HCPCS | Mod: HHH

## 2024-05-10 PROCEDURE — 1090000001 HH PPS REVENUE CREDIT

## 2024-05-11 PROCEDURE — 1090000001 HH PPS REVENUE CREDIT

## 2024-05-11 PROCEDURE — 1090000002 HH PPS REVENUE DEBIT

## 2024-05-12 PROCEDURE — 1090000002 HH PPS REVENUE DEBIT

## 2024-05-12 PROCEDURE — 1090000001 HH PPS REVENUE CREDIT

## 2024-05-13 ENCOUNTER — HOME CARE VISIT (OUTPATIENT)
Dept: HOME HEALTH SERVICES | Facility: HOME HEALTH | Age: 69
End: 2024-05-13
Payer: MEDICARE

## 2024-05-13 PROCEDURE — 1090000002 HH PPS REVENUE DEBIT

## 2024-05-13 PROCEDURE — G0299 HHS/HOSPICE OF RN EA 15 MIN: HCPCS | Mod: HHH

## 2024-05-13 PROCEDURE — 1090000001 HH PPS REVENUE CREDIT

## 2024-05-13 SDOH — ECONOMIC STABILITY: GENERAL

## 2024-05-13 SDOH — ECONOMIC STABILITY: FOOD INSECURITY: MEALS PER DAY: 3

## 2024-05-13 ASSESSMENT — ENCOUNTER SYMPTOMS
SUBJECTIVE PAIN PROGRESSION: UNCHANGED
PAIN LOCATION: LEFT LEG
PERSON REPORTING PAIN: PATIENT
PAIN LOCATION - PAIN QUALITY: ACHY
PAIN LOCATION - PAIN FREQUENCY: INTERMITTENT
PAIN SEVERITY GOAL: 0/10
PAIN: 1
APPETITE LEVEL: GOOD
PAIN LOCATION - PAIN SEVERITY: 2/10
CHANGE IN APPETITE: UNCHANGED
LOWEST PAIN SEVERITY IN PAST 24 HOURS: 0/10
HIGHEST PAIN SEVERITY IN PAST 24 HOURS: 1/10

## 2024-05-13 ASSESSMENT — ACTIVITIES OF DAILY LIVING (ADL)
MONEY MANAGEMENT (EXPENSES/BILLS): INDEPENDENT
AMBULATION ASSISTANCE: STAND BY ASSIST
CURRENT_FUNCTION: ONE PERSON

## 2024-05-14 ENCOUNTER — OFFICE VISIT (OUTPATIENT)
Dept: SURGICAL ONCOLOGY | Facility: CLINIC | Age: 69
End: 2024-05-14
Payer: MEDICARE

## 2024-05-14 VITALS
RESPIRATION RATE: 16 BRPM | WEIGHT: 244.27 LBS | BODY MASS INDEX: 36.07 KG/M2 | TEMPERATURE: 97.9 F | DIASTOLIC BLOOD PRESSURE: 88 MMHG | HEART RATE: 78 BPM | SYSTOLIC BLOOD PRESSURE: 145 MMHG

## 2024-05-14 DIAGNOSIS — Z94.5 STATUS POST FULL THICKNESS SKIN GRAFT: ICD-10-CM

## 2024-05-14 PROCEDURE — 3079F DIAST BP 80-89 MM HG: CPT | Performed by: SURGERY

## 2024-05-14 PROCEDURE — 1090000001 HH PPS REVENUE CREDIT

## 2024-05-14 PROCEDURE — 99024 POSTOP FOLLOW-UP VISIT: CPT | Performed by: SURGERY

## 2024-05-14 PROCEDURE — 1159F MED LIST DOCD IN RCRD: CPT | Performed by: SURGERY

## 2024-05-14 PROCEDURE — 3077F SYST BP >= 140 MM HG: CPT | Performed by: SURGERY

## 2024-05-14 PROCEDURE — 99212 OFFICE O/P EST SF 10 MIN: CPT | Performed by: SURGERY

## 2024-05-14 PROCEDURE — 1090000002 HH PPS REVENUE DEBIT

## 2024-05-14 RX ORDER — DIPHENHYDRAMINE HCL 25 MG
1 TABLET ORAL DAILY
Qty: 15 ML | Refills: 3 | Status: SHIPPED | OUTPATIENT
Start: 2024-05-14 | End: 2024-06-13

## 2024-05-14 ASSESSMENT — ENCOUNTER SYMPTOMS
LOSS OF SENSATION IN FEET: 0
OCCASIONAL FEELINGS OF UNSTEADINESS: 0
DEPRESSION: 0

## 2024-05-14 NOTE — PROGRESS NOTES
Graft Check    Referring Provider:  Dr. Jarred Ruffin MD    Chief Complaint:  Left foot melanoma    History of Present Illness:  This is a 69 y.o. 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 18 mitoses per mm2.  The patient had a lesion on his foot the was evaluated by podiatry and excised with a marginal excision demonstrating the melanoma.  He has no other lesions of concern but has not seen a dermatologist.    Surgery 4/17/2024 - Wide excision left foot melanoma with 1cm margin, full thickness skin graft, and left inguinal sentinel lymph node biopsy.   Pathology - Residual AMH suggestive of MIS at the 9-10 o'clock margin. 0/2 LN involved.    5/14/2024 - Graft check. The patient has been wearing surgical shoe regularly. No increased pain. Home health has been helping to clean the foot and change the dressing.       Review of Systems:  A complete 12 point review of systems was performed and is negative except as noted in the history of present illness.    Vital Signs:  Vitals:    05/14/24 1512   BP: 145/88   Pulse: 78   Resp: 16   Temp: 36.6 °C (97.9 °F)     Physical Exam:  GEN: No acute distress, Healthy appearing  HEENT: Moist mucus membranes, normocephalic  CARDS: RRR  PULM: No respiratory distress  LYMPH: Left inguinal incision is well-healed  SKIN: Left dorsal distal foot skin graft is approximated to the underlying base. Areas of perfused graft laterally and ischemic graft distally. Gentle debridement performed. No evidence for infection.   NEURO: No gross sensorimotor deficits  EXT: No significant arm or leg swelling    Laboratory Values:  Lab Results   Component Value Date    WBC 9.0 04/02/2024    HGB 15.2 04/02/2024    HCT 47.8 04/02/2024    MCV 97 04/02/2024     04/02/2024        Chemistry    Lab Results   Component Value Date/Time     04/02/2024 1737    K 4.3 04/02/2024 1737     04/02/2024 1737    CO2 26 04/02/2024 1737    BUN 17  "04/02/2024 1737    CREATININE 1.08 04/02/2024 1737    Lab Results   Component Value Date/Time    CALCIUM 10.3 04/02/2024 1737           No results found for: \"PR1\"    Assessment:  This is a 69 y.o. 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 18 mitoses per mm2.  The patient has no clinically palpable lymphadenopathy.    Surgery 4/17/2024 - Wide excision left foot melanoma with 1cm margin, full thickness skin graft, and left inguinal sentinel lymph node biopsy.   Pathology - Residual AMH suggestive of MIS at the 9-10 o'clock margin. 0/2 LN involved.    Plan:  --Change to [facility] daily. Order placed.   -- Follow-up in 1 week for graft check and possible debridement  -- Pathology suggestive of MIS at the margin (9:00-10:00). Recommended for re-excision. This will be performed after the graft heals and the inflammation subsides. Will consider Mohs per patient discussion  --The patient asked very appropriate questions that were answered to the best of my ability with the current information at hand. He knows to call with any questions or concerns that arise        Jethro Ba MD, MPH    "

## 2024-05-15 PROCEDURE — 1090000001 HH PPS REVENUE CREDIT

## 2024-05-15 PROCEDURE — 1090000002 HH PPS REVENUE DEBIT

## 2024-05-15 ASSESSMENT — ENCOUNTER SYMPTOMS
HIGHEST PAIN SEVERITY IN PAST 24 HOURS: 8/10
PAIN SEVERITY GOAL: 0/10
APPETITE LEVEL: GOOD
PAIN LOCATION: LEFT FOOT
HYPERTENSION: 1
OCCASIONAL FEELINGS OF UNSTEADINESS: 0
SUBJECTIVE PAIN PROGRESSION: UNCHANGED
LOWEST PAIN SEVERITY IN PAST 24 HOURS: 8/10
PAIN: 1
PERSON REPORTING PAIN: PATIENT
CHANGE IN APPETITE: UNCHANGED

## 2024-05-15 ASSESSMENT — ACTIVITIES OF DAILY LIVING (ADL): MONEY MANAGEMENT (EXPENSES/BILLS): INDEPENDENT

## 2024-05-16 ENCOUNTER — HOME CARE VISIT (OUTPATIENT)
Dept: HOME HEALTH SERVICES | Facility: HOME HEALTH | Age: 69
End: 2024-05-16
Payer: MEDICARE

## 2024-05-16 VITALS
DIASTOLIC BLOOD PRESSURE: 70 MMHG | HEART RATE: 80 BPM | SYSTOLIC BLOOD PRESSURE: 126 MMHG | OXYGEN SATURATION: 100 % | TEMPERATURE: 98.9 F | RESPIRATION RATE: 19 BRPM

## 2024-05-16 VITALS — SYSTOLIC BLOOD PRESSURE: 161 MMHG | HEART RATE: 79 BPM | TEMPERATURE: 98 F | DIASTOLIC BLOOD PRESSURE: 102 MMHG

## 2024-05-16 PROCEDURE — 1090000002 HH PPS REVENUE DEBIT

## 2024-05-16 PROCEDURE — G0300 HHS/HOSPICE OF LPN EA 15 MIN: HCPCS | Mod: HHH

## 2024-05-16 PROCEDURE — 1090000001 HH PPS REVENUE CREDIT

## 2024-05-16 SDOH — ECONOMIC STABILITY: GENERAL

## 2024-05-16 SDOH — ECONOMIC STABILITY: FOOD INSECURITY: MEALS PER DAY: 3

## 2024-05-16 ASSESSMENT — ENCOUNTER SYMPTOMS
HIGHEST PAIN SEVERITY IN PAST 24 HOURS: 6/10
PAIN SEVERITY GOAL: 0/10
PAIN: 1
PAIN LOCATION - PAIN FREQUENCY: INTERMITTENT
SUBJECTIVE PAIN PROGRESSION: UNCHANGED
PAIN LOCATION - PAIN QUALITY: SORE
PAIN: 1
PERSON REPORTING PAIN: PATIENT
LOWEST PAIN SEVERITY IN PAST 24 HOURS: 0/10
MUSCLE WEAKNESS: 1
OCCASIONAL FEELINGS OF UNSTEADINESS: 0
PAIN LOCATION: LEFT FOOT
APPETITE LEVEL: FAIR
PAIN SEVERITY GOAL: 0/10
PERSON REPORTING PAIN: PATIENT
LOWEST PAIN SEVERITY IN PAST 24 HOURS: 6/10
HYPERTENSION: 1
CHANGE IN APPETITE: UNCHANGED
CHANGE IN APPETITE: UNCHANGED
HIGHEST PAIN SEVERITY IN PAST 24 HOURS: 1/10
SUBJECTIVE PAIN PROGRESSION: UNCHANGED
APPETITE LEVEL: GOOD
PAIN LOCATION: RIGHT FOOT
PAIN LOCATION - PAIN SEVERITY: 2/10

## 2024-05-16 ASSESSMENT — ACTIVITIES OF DAILY LIVING (ADL)
CURRENT_FUNCTION: ONE PERSON
AMBULATION ASSISTANCE: ONE PERSON
MONEY MANAGEMENT (EXPENSES/BILLS): NEEDS ASSISTANCE

## 2024-05-17 PROCEDURE — 1090000002 HH PPS REVENUE DEBIT

## 2024-05-17 PROCEDURE — 1090000001 HH PPS REVENUE CREDIT

## 2024-05-18 ENCOUNTER — APPOINTMENT (OUTPATIENT)
Dept: HOME HEALTH SERVICES | Facility: HOME HEALTH | Age: 69
End: 2024-05-18
Payer: MEDICARE

## 2024-05-18 PROCEDURE — 1090000002 HH PPS REVENUE DEBIT

## 2024-05-18 PROCEDURE — 1090000001 HH PPS REVENUE CREDIT

## 2024-05-19 ENCOUNTER — HOME CARE VISIT (OUTPATIENT)
Dept: HOME HEALTH SERVICES | Facility: HOME HEALTH | Age: 69
End: 2024-05-19
Payer: MEDICARE

## 2024-05-19 PROCEDURE — 1090000001 HH PPS REVENUE CREDIT

## 2024-05-19 PROCEDURE — 1090000002 HH PPS REVENUE DEBIT

## 2024-05-20 ENCOUNTER — HOME CARE VISIT (OUTPATIENT)
Dept: HOME HEALTH SERVICES | Facility: HOME HEALTH | Age: 69
End: 2024-05-20
Payer: MEDICARE

## 2024-05-20 VITALS — TEMPERATURE: 97.9 F | HEART RATE: 76 BPM | SYSTOLIC BLOOD PRESSURE: 158 MMHG | DIASTOLIC BLOOD PRESSURE: 100 MMHG

## 2024-05-20 PROCEDURE — 1090000001 HH PPS REVENUE CREDIT

## 2024-05-20 PROCEDURE — 1090000002 HH PPS REVENUE DEBIT

## 2024-05-20 PROCEDURE — G0300 HHS/HOSPICE OF LPN EA 15 MIN: HCPCS | Mod: HHH

## 2024-05-20 ASSESSMENT — ENCOUNTER SYMPTOMS
PAIN SEVERITY GOAL: 0/10
PAIN: 1
APPETITE LEVEL: GOOD
HIGHEST PAIN SEVERITY IN PAST 24 HOURS: 7/10
LOWEST PAIN SEVERITY IN PAST 24 HOURS: 7/10
OCCASIONAL FEELINGS OF UNSTEADINESS: 0
CHANGE IN APPETITE: UNCHANGED
HYPERTENSION: 1
PERSON REPORTING PAIN: PATIENT
SUBJECTIVE PAIN PROGRESSION: UNCHANGED
PAIN LOCATION: LEFT FOOT

## 2024-05-20 ASSESSMENT — ACTIVITIES OF DAILY LIVING (ADL): MONEY MANAGEMENT (EXPENSES/BILLS): INDEPENDENT

## 2024-05-21 ENCOUNTER — OFFICE VISIT (OUTPATIENT)
Dept: SURGICAL ONCOLOGY | Facility: CLINIC | Age: 69
End: 2024-05-21
Payer: MEDICARE

## 2024-05-21 VITALS
TEMPERATURE: 97.3 F | HEART RATE: 89 BPM | SYSTOLIC BLOOD PRESSURE: 155 MMHG | RESPIRATION RATE: 16 BRPM | OXYGEN SATURATION: 95 % | DIASTOLIC BLOOD PRESSURE: 95 MMHG | BODY MASS INDEX: 36.27 KG/M2 | WEIGHT: 245.59 LBS

## 2024-05-21 DIAGNOSIS — Z94.5 STATUS POST FULL THICKNESS SKIN GRAFT: Primary | ICD-10-CM

## 2024-05-21 DIAGNOSIS — C43.72 MELANOMA OF FOOT, LEFT (MULTI): ICD-10-CM

## 2024-05-21 PROCEDURE — 3080F DIAST BP >= 90 MM HG: CPT | Performed by: SURGERY

## 2024-05-21 PROCEDURE — 99024 POSTOP FOLLOW-UP VISIT: CPT | Performed by: SURGERY

## 2024-05-21 PROCEDURE — 1126F AMNT PAIN NOTED NONE PRSNT: CPT | Performed by: SURGERY

## 2024-05-21 PROCEDURE — 1159F MED LIST DOCD IN RCRD: CPT | Performed by: SURGERY

## 2024-05-21 PROCEDURE — 1090000001 HH PPS REVENUE CREDIT

## 2024-05-21 PROCEDURE — 3077F SYST BP >= 140 MM HG: CPT | Performed by: SURGERY

## 2024-05-21 PROCEDURE — 1090000002 HH PPS REVENUE DEBIT

## 2024-05-21 PROCEDURE — 99213 OFFICE O/P EST LOW 20 MIN: CPT | Performed by: SURGERY

## 2024-05-21 ASSESSMENT — ENCOUNTER SYMPTOMS
DEPRESSION: 0
LOSS OF SENSATION IN FEET: 0
OCCASIONAL FEELINGS OF UNSTEADINESS: 0

## 2024-05-21 ASSESSMENT — PAIN SCALES - GENERAL: PAINLEVEL: 0-NO PAIN

## 2024-05-21 NOTE — PROGRESS NOTES
Graft Check    Referring Provider:  Dr. Jarred Ruffin MD    Chief Complaint:  Left foot melanoma    History of Present Illness:  This is a 69 y.o. 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 18 mitoses per mm2.  The patient had a lesion on his foot the was evaluated by podiatry and excised with a marginal excision demonstrating the melanoma.  He has no other lesions of concern but has not seen a dermatologist.    Surgery 4/17/2024 - Wide excision left foot melanoma with 1cm margin, full thickness skin graft, and left inguinal sentinel lymph node biopsy.   Pathology - Residual AMH suggestive of MIS at the 9-10 o'clock margin. 0/2 LN involved.    5/21/2024 - Graft check. Less pain. Manuka honey started 3 days ago. No issues. Has not started showering, and I encouraged that he start this regularly to keep the graft and foot clean      Review of Systems:  A complete 12 point review of systems was performed and is negative except as noted in the history of present illness.    Vital Signs:  Vitals:    05/21/24 1415   BP: (!) 155/95   Pulse: 89   Resp: 16   Temp: 36.3 °C (97.3 °F)   SpO2: 95%     Physical Exam:  GEN: No acute distress, Healthy appearing  HEENT: Moist mucus membranes, normocephalic  CARDS: RRR  PULM: No respiratory distress  LYMPH: Left inguinal incision is well-healed  SKIN: Left dorsal distal foot skin graft is apposed to the underlying base. The graft was debrided for areas of callous superficially. No evidence of infection.   NEURO: No gross sensorimotor deficits  EXT: No significant arm or leg swelling    Laboratory Values:  Lab Results   Component Value Date    WBC 9.0 04/02/2024    HGB 15.2 04/02/2024    HCT 47.8 04/02/2024    MCV 97 04/02/2024     04/02/2024        Chemistry    Lab Results   Component Value Date/Time     04/02/2024 1737    K 4.3 04/02/2024 1737     04/02/2024 1737    CO2 26 04/02/2024 1737    BUN 17 04/02/2024  "1737    CREATININE 1.08 04/02/2024 1737    Lab Results   Component Value Date/Time    CALCIUM 10.3 04/02/2024 1737           No results found for: \"PR1\"    Assessment:  This is a 69 y.o. 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 18 mitoses per mm2.  The patient has no clinically palpable lymphadenopathy.    Surgery 4/17/2024 - Wide excision left foot melanoma with 1cm margin, full thickness skin graft, and left inguinal sentinel lymph node biopsy.   Pathology - Residual AMH suggestive of MIS at the 9-10 o'clock margin. 0/2 LN involved.    Plan:  --Continue Medihoney daily. Shower daily as well allowing the wound to get wet  -- Follow-up in 1 week for graft check and possible debridement  -- Pathology suggestive of MIS at the margin (9:00-10:00). Recommended for re-excision. This will be performed after the graft heals and the inflammation subsides. Will consider Mohs per patient discussion  --The patient asked very appropriate questions that were answered to the best of my ability with the current information at hand. He knows to call with any questions or concerns that arise        Jethro Ba MD, MPH    "

## 2024-05-22 ENCOUNTER — HOME CARE VISIT (OUTPATIENT)
Dept: HOME HEALTH SERVICES | Facility: HOME HEALTH | Age: 69
End: 2024-05-22
Payer: MEDICARE

## 2024-05-22 VITALS — DIASTOLIC BLOOD PRESSURE: 98 MMHG | SYSTOLIC BLOOD PRESSURE: 160 MMHG | TEMPERATURE: 97.9 F | HEART RATE: 79 BPM

## 2024-05-22 PROCEDURE — 1090000001 HH PPS REVENUE CREDIT

## 2024-05-22 PROCEDURE — G0300 HHS/HOSPICE OF LPN EA 15 MIN: HCPCS | Mod: HHH

## 2024-05-22 PROCEDURE — 1090000002 HH PPS REVENUE DEBIT

## 2024-05-22 ASSESSMENT — ENCOUNTER SYMPTOMS
LOWEST PAIN SEVERITY IN PAST 24 HOURS: 6/10
CHANGE IN APPETITE: UNCHANGED
HYPERTENSION: 1
PAIN SEVERITY GOAL: 0/10
PAIN: 1
APPETITE LEVEL: GOOD
OCCASIONAL FEELINGS OF UNSTEADINESS: 0
PERSON REPORTING PAIN: PATIENT
HIGHEST PAIN SEVERITY IN PAST 24 HOURS: 6/10

## 2024-05-22 ASSESSMENT — ACTIVITIES OF DAILY LIVING (ADL): MONEY MANAGEMENT (EXPENSES/BILLS): INDEPENDENT

## 2024-05-23 PROCEDURE — 1090000002 HH PPS REVENUE DEBIT

## 2024-05-23 PROCEDURE — 1090000001 HH PPS REVENUE CREDIT

## 2024-05-24 PROCEDURE — 1090000002 HH PPS REVENUE DEBIT

## 2024-05-24 PROCEDURE — 1090000001 HH PPS REVENUE CREDIT

## 2024-05-28 ENCOUNTER — HOME CARE VISIT (OUTPATIENT)
Dept: HOME HEALTH SERVICES | Facility: HOME HEALTH | Age: 69
End: 2024-05-28
Payer: MEDICARE

## 2024-05-28 ENCOUNTER — OFFICE VISIT (OUTPATIENT)
Dept: SURGICAL ONCOLOGY | Facility: CLINIC | Age: 69
End: 2024-05-28
Payer: COMMERCIAL

## 2024-05-28 VITALS
TEMPERATURE: 97.7 F | WEIGHT: 245.59 LBS | BODY MASS INDEX: 36.27 KG/M2 | DIASTOLIC BLOOD PRESSURE: 88 MMHG | RESPIRATION RATE: 20 BRPM | SYSTOLIC BLOOD PRESSURE: 138 MMHG | HEART RATE: 107 BPM

## 2024-05-28 DIAGNOSIS — M05.79 RHEUMATOID ARTHRITIS INVOLVING MULTIPLE SITES WITH POSITIVE RHEUMATOID FACTOR (MULTI): ICD-10-CM

## 2024-05-28 DIAGNOSIS — Z94.5 STATUS POST FULL THICKNESS SKIN GRAFT: Primary | ICD-10-CM

## 2024-05-28 PROCEDURE — 99213 OFFICE O/P EST LOW 20 MIN: CPT | Performed by: SURGERY

## 2024-05-28 PROCEDURE — 3079F DIAST BP 80-89 MM HG: CPT | Performed by: SURGERY

## 2024-05-28 PROCEDURE — 1126F AMNT PAIN NOTED NONE PRSNT: CPT | Performed by: SURGERY

## 2024-05-28 PROCEDURE — 1159F MED LIST DOCD IN RCRD: CPT | Performed by: SURGERY

## 2024-05-28 PROCEDURE — 3075F SYST BP GE 130 - 139MM HG: CPT | Performed by: SURGERY

## 2024-05-28 PROCEDURE — 99024 POSTOP FOLLOW-UP VISIT: CPT | Performed by: SURGERY

## 2024-05-28 ASSESSMENT — ENCOUNTER SYMPTOMS
LOSS OF SENSATION IN FEET: 0
OCCASIONAL FEELINGS OF UNSTEADINESS: 0
DEPRESSION: 0

## 2024-05-28 ASSESSMENT — PAIN SCALES - GENERAL: PAINLEVEL: 0-NO PAIN

## 2024-05-28 NOTE — PROGRESS NOTES
Graft Check    Referring Provider:  Dr. Jarred Ruffin MD    Chief Complaint:  Left foot melanoma    History of Present Illness:  This is a 69 y.o. 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 18 mitoses per mm2.  The patient had a lesion on his foot the was evaluated by podiatry and excised with a marginal excision demonstrating the melanoma.  He has no other lesions of concern but has not seen a dermatologist.    Surgery 4/17/2024 - Wide excision left foot melanoma with 1cm margin, full thickness skin graft, and left inguinal sentinel lymph node biopsy.   Pathology - Residual AMH suggestive of MIS at the 9-10 o'clock margin. 0/2 LN involved.    5/21/2024 - Graft check. Less pain. Manuka honey started 3 days ago. No issues. Has not started showering, and I encouraged that he start this regularly to keep the graft and foot clean      Review of Systems:  A complete 12 point review of systems was performed and is negative except as noted in the history of present illness.    Vital Signs:  Vitals:    05/28/24 1440   BP: 138/88   Pulse: 107   Resp: 20   Temp: 36.5 °C (97.7 °F)     Physical Exam:  GEN: No acute distress, Healthy appearing  HEENT: Moist mucus membranes, normocephalic  CARDS: RRR  PULM: No respiratory distress  LYMPH: Left inguinal incision is well-healed  SKIN: Left dorsal distal foot skin graft is apposed to the underlying base. The graft was debrided for areas of callous superficially. No evidence of infection.   NEURO: No gross sensorimotor deficits  EXT: No significant arm or leg swelling    Laboratory Values:  Lab Results   Component Value Date    WBC 9.0 04/02/2024    HGB 15.2 04/02/2024    HCT 47.8 04/02/2024    MCV 97 04/02/2024     04/02/2024        Chemistry    Lab Results   Component Value Date/Time     04/02/2024 1737    K 4.3 04/02/2024 1737     04/02/2024 1737    CO2 26 04/02/2024 1737    BUN 17 04/02/2024 1737     "CREATININE 1.08 04/02/2024 1737    Lab Results   Component Value Date/Time    CALCIUM 10.3 04/02/2024 1737           No results found for: \"PR1\"    Assessment:  This is a 69 y.o. 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 18 mitoses per mm2.  The patient has no clinically palpable lymphadenopathy.    Surgery 4/17/2024 - Wide excision left foot melanoma with 1cm margin, full thickness skin graft, and left inguinal sentinel lymph node biopsy.   Pathology - Residual AMH suggestive of MIS at the 9-10 o'clock margin. 0/2 LN involved.    Plan:  --Continue Medihoney daily. Shower daily as well allowing the wound to get wet  -- Referral to Wound care for consideration of hyperbaric O2 treatment or other interventions.   -- Referral to Rheumatology has been a challenge, but we will facilitate this for ongoing management of immunosuppression (for RA; prior outside Rheumatologist is retiring).   -- Follow-up in 2 weeks for graft check and possible debridement  -- Pathology suggestive of MIS at the margin (9:00-10:00). Recommended for re-excision. This will be performed after the graft heals and the inflammation subsides. Will consider Mohs per patient discussion  --The patient asked very appropriate questions that were answered to the best of my ability with the current information at hand. He knows to call with any questions or concerns that arise        Jethro Ba MD, MPH    "

## 2024-05-31 ENCOUNTER — HOME CARE VISIT (OUTPATIENT)
Dept: HOME HEALTH SERVICES | Facility: HOME HEALTH | Age: 69
End: 2024-05-31
Payer: MEDICARE

## 2024-06-03 ENCOUNTER — OFFICE VISIT (OUTPATIENT)
Dept: WOUND CARE | Facility: CLINIC | Age: 69
End: 2024-06-03
Payer: COMMERCIAL

## 2024-06-03 ENCOUNTER — HOME CARE VISIT (OUTPATIENT)
Dept: HOME HEALTH SERVICES | Facility: HOME HEALTH | Age: 69
End: 2024-06-03
Payer: MEDICARE

## 2024-06-03 DIAGNOSIS — Z94.5 STATUS POST FULL THICKNESS SKIN GRAFT: ICD-10-CM

## 2024-06-03 PROCEDURE — 99203 OFFICE O/P NEW LOW 30 MIN: CPT | Performed by: NURSE PRACTITIONER

## 2024-06-03 PROCEDURE — 99213 OFFICE O/P EST LOW 20 MIN: CPT

## 2024-06-03 ASSESSMENT — ENCOUNTER SYMPTOMS
PERSON REPORTING PAIN: PATIENT
PAIN: 1
PAIN LOCATION: GENERALIZED

## 2024-06-03 ASSESSMENT — ACTIVITIES OF DAILY LIVING (ADL)
OASIS_M1830: 01
HOME_HEALTH_OASIS: 01

## 2024-06-06 DIAGNOSIS — E10.42 TYPE 1 DIABETES MELLITUS WITH DIABETIC POLYNEUROPATHY (MULTI): Primary | ICD-10-CM

## 2024-06-06 RX ORDER — BLOOD SUGAR DIAGNOSTIC
STRIP MISCELLANEOUS
Qty: 500 STRIP | Refills: 1 | Status: SHIPPED | OUTPATIENT
Start: 2024-06-06

## 2024-06-06 RX ORDER — ISOPROPYL ALCOHOL 70 ML/100ML
SWAB TOPICAL
Qty: 500 EACH | Refills: 1 | Status: SHIPPED | OUTPATIENT
Start: 2024-06-06

## 2024-06-06 NOTE — TELEPHONE ENCOUNTER
Rx refill request for test strips and alcohol swabs requested by patient to be sent into [facility].     Rx's pending to be sent   Shahana

## 2024-06-11 ENCOUNTER — OFFICE VISIT (OUTPATIENT)
Dept: SURGICAL ONCOLOGY | Facility: CLINIC | Age: 69
End: 2024-06-11
Payer: COMMERCIAL

## 2024-06-11 VITALS
BODY MASS INDEX: 36.53 KG/M2 | DIASTOLIC BLOOD PRESSURE: 87 MMHG | OXYGEN SATURATION: 93 % | WEIGHT: 247.36 LBS | SYSTOLIC BLOOD PRESSURE: 146 MMHG | RESPIRATION RATE: 16 BRPM | HEART RATE: 85 BPM | TEMPERATURE: 97.5 F

## 2024-06-11 DIAGNOSIS — C43.72 MELANOMA OF FOOT, LEFT (MULTI): ICD-10-CM

## 2024-06-11 DIAGNOSIS — T86.821: Primary | ICD-10-CM

## 2024-06-11 PROCEDURE — 1159F MED LIST DOCD IN RCRD: CPT | Performed by: SURGERY

## 2024-06-11 PROCEDURE — 3077F SYST BP >= 140 MM HG: CPT | Performed by: SURGERY

## 2024-06-11 PROCEDURE — 1125F AMNT PAIN NOTED PAIN PRSNT: CPT | Performed by: SURGERY

## 2024-06-11 PROCEDURE — 3079F DIAST BP 80-89 MM HG: CPT | Performed by: SURGERY

## 2024-06-11 PROCEDURE — 1036F TOBACCO NON-USER: CPT | Performed by: SURGERY

## 2024-06-11 PROCEDURE — 99024 POSTOP FOLLOW-UP VISIT: CPT | Performed by: SURGERY

## 2024-06-11 PROCEDURE — 99213 OFFICE O/P EST LOW 20 MIN: CPT | Performed by: SURGERY

## 2024-06-11 ASSESSMENT — PAIN SCALES - GENERAL: PAINLEVEL: 6

## 2024-06-11 NOTE — PROGRESS NOTES
Wound Check    Referring Provider:  Dr. Jarred Ruffin MD    Chief Complaint:  Left foot melanoma    History of Present Illness:  This is a 69 y.o. 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 18 mitoses per mm2.  The patient had a lesion on his foot the was evaluated by podiatry and excised with a marginal excision demonstrating the melanoma.  He has no other lesions of concern but has not seen a dermatologist.    Surgery 4/17/2024 - Wide excision left foot melanoma with 1cm margin, full thickness skin graft, and left inguinal sentinel lymph node biopsy.   Pathology - Residual AMH suggestive of MIS at the 9-10 o'clock margin. 0/2 LN involved.    6/11/2024 - Wound Check. The patient continues with Medihoney daily. He has seen The wound care centers at [facility] and at [facility] for his failed skin graft and need for wound healing in the face of his diabetes. [facility] will be able to treat him and I spoke with Dr. Ferrara today about this case.       Review of Systems:  A complete 12 point review of systems was performed and is negative except as noted in the history of present illness.    Vital Signs:  Vitals:    06/11/24 1341   BP: 146/87   Pulse: 85   Resp: 16   Temp: 36.4 °C (97.5 °F)   SpO2: 93%     Physical Exam:  GEN: No acute distress, Healthy appearing  HEENT: Moist mucus membranes, normocephalic  CARDS: RRR  PULM: No respiratory distress  SKIN: Left dorsal distal foot skin graft is apposed to the underlying base although it has become an eschar. The graft was debrided for areas of callous superficially. No evidence of infection.   NEURO: No gross sensorimotor deficits  EXT: No significant arm or leg swelling    Laboratory Values:  Lab Results   Component Value Date    WBC 9.0 04/02/2024    HGB 15.2 04/02/2024    HCT 47.8 04/02/2024    MCV 97 04/02/2024     04/02/2024        Chemistry    Lab Results   Component Value Date/Time     04/02/2024 7957  "   K 4.3 04/02/2024 1737     04/02/2024 1737    CO2 26 04/02/2024 1737    BUN 17 04/02/2024 1737    CREATININE 1.08 04/02/2024 1737    Lab Results   Component Value Date/Time    CALCIUM 10.3 04/02/2024 1737           No results found for: \"PR1\"    Assessment:  This is a 69 y.o. 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 18 mitoses per mm2.  The patient has no clinically palpable lymphadenopathy.    Surgery 4/17/2024 - Wide excision left foot melanoma with 1cm margin, full thickness skin graft, and left inguinal sentinel lymph node biopsy.   Pathology - Residual AMH suggestive of MIS at the 9-10 o'clock margin. 0/2 LN involved.    Plan:  -- Continue Medihoney daily. Shower daily as well allowing the wound to get wet  -- Failed Skin Graft Left Foot: Start with Wound care Center for hyperbaric O2 treatment or other interventions.   -- Referral to Rheumatology has been a challenge, but we will facilitate this for ongoing management of immunosuppression (for RA; prior outside Rheumatologist is retiring).   -- Follow-up in 3 weeks for graft check   -- Pathology suggestive of MIS at the margin (9:00-10:00). Recommended for re-excision. This will be performed after the graft heals and the inflammation subsides. Will plan for Mohs per patient discussion. This is scheduled 8/8/2024 per patient's request  --The patient asked very appropriate questions that were answered to the best of my ability with the current information at hand. He knows to call with any questions or concerns that arise    Jethro Ba MD, MPH    "

## 2024-06-13 DIAGNOSIS — E10.42 TYPE 1 DIABETES MELLITUS WITH DIABETIC POLYNEUROPATHY (MULTI): ICD-10-CM

## 2024-06-13 RX ORDER — INSULIN LISPRO 100 [IU]/ML
INJECTION, SOLUTION INTRAVENOUS; SUBCUTANEOUS
Qty: 150 ML | Refills: 1 | Status: SHIPPED | OUTPATIENT
Start: 2024-06-13

## 2024-06-18 ENCOUNTER — TELEPHONE (OUTPATIENT)
Dept: ENDOCRINOLOGY | Facility: CLINIC | Age: 69
End: 2024-06-18
Payer: MEDICARE

## 2024-06-18 NOTE — TELEPHONE ENCOUNTER
Marguerite Estrellav   1955   [id number]   [phone]     Patient called in regards to a letter that he received about medication change. Patient was instructed to follow up with insurance company for more details and if additional assistance is needed to call office back.

## 2024-07-02 ENCOUNTER — OFFICE VISIT (OUTPATIENT)
Dept: SURGICAL ONCOLOGY | Facility: CLINIC | Age: 69
End: 2024-07-02
Payer: COMMERCIAL

## 2024-07-02 VITALS
TEMPERATURE: 97 F | OXYGEN SATURATION: 95 % | RESPIRATION RATE: 18 BRPM | HEART RATE: 97 BPM | WEIGHT: 249.12 LBS | SYSTOLIC BLOOD PRESSURE: 145 MMHG | DIASTOLIC BLOOD PRESSURE: 85 MMHG | BODY MASS INDEX: 36.79 KG/M2

## 2024-07-02 DIAGNOSIS — T86.821: Primary | ICD-10-CM

## 2024-07-02 PROCEDURE — 99024 POSTOP FOLLOW-UP VISIT: CPT | Performed by: SURGERY

## 2024-07-02 PROCEDURE — 3079F DIAST BP 80-89 MM HG: CPT | Performed by: SURGERY

## 2024-07-02 PROCEDURE — 1159F MED LIST DOCD IN RCRD: CPT | Performed by: SURGERY

## 2024-07-02 PROCEDURE — 99213 OFFICE O/P EST LOW 20 MIN: CPT | Performed by: SURGERY

## 2024-07-02 PROCEDURE — 3077F SYST BP >= 140 MM HG: CPT | Performed by: SURGERY

## 2024-07-02 PROCEDURE — 1126F AMNT PAIN NOTED NONE PRSNT: CPT | Performed by: SURGERY

## 2024-07-02 ASSESSMENT — PAIN SCALES - GENERAL: PAINLEVEL: 0-NO PAIN

## 2024-07-02 ASSESSMENT — ENCOUNTER SYMPTOMS
OCCASIONAL FEELINGS OF UNSTEADINESS: 0
LOSS OF SENSATION IN FEET: 0

## 2024-07-02 NOTE — PROGRESS NOTES
Wound Check    Referring Provider:  Dr. Jarred Ruffin MD    Chief Complaint:  Left foot melanoma    History of Present Illness:  This is a 69 y.o. 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 18 mitoses per mm2.  The patient had a lesion on his foot the was evaluated by podiatry and excised with a marginal excision demonstrating the melanoma.  He has no other lesions of concern but has not seen a dermatologist.    Surgery 4/17/2024 - Wide excision left foot melanoma with 1cm margin, full thickness skin graft, and left inguinal sentinel lymph node biopsy.   Pathology - Residual AMH suggestive of MIS at the 9-10 o'clock margin. 0/2 LN involved.    7/2/2024 - Wound Check. Has completed 7 days of Hyperbaric O2 therapy. No acute issues. Allowing the wound to air out regularly      Review of Systems:  A complete 12 point review of systems was performed and is negative except as noted in the history of present illness.    Vital Signs:  Vitals:    07/02/24 1433   BP: 145/85   Pulse: 97   Resp: 18   Temp: 36.1 °C (97 °F)   SpO2: 95%     Physical Exam:  GEN: No acute distress, Healthy appearing  HEENT: Moist mucus membranes, normocephalic  CARDS: RRR  PULM: No respiratory distress  SKIN: Left dorsal distal foot skin graft is apposed to the underlying base although it has become an eschar. This was debrided with a healthy base noted laterally  NEURO: No gross sensorimotor deficits  EXT: No significant arm or leg swelling    Laboratory Values:  Lab Results   Component Value Date    WBC 9.0 04/02/2024    HGB 15.2 04/02/2024    HCT 47.8 04/02/2024    MCV 97 04/02/2024     04/02/2024        Chemistry    Lab Results   Component Value Date/Time     04/02/2024 1737    K 4.3 04/02/2024 1737     04/02/2024 1737    CO2 26 04/02/2024 1737    BUN 17 04/02/2024 1737    CREATININE 1.08 04/02/2024 1737    Lab Results   Component Value Date/Time    CALCIUM 10.3  "04/02/2024 1737           No results found for: \"PR1\"    Assessment:  This is a 69 y.o. 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 18 mitoses per mm2.  The patient has no clinically palpable lymphadenopathy.    Surgery 4/17/2024 - Wide excision left foot melanoma with 1cm margin, full thickness skin graft, and left inguinal sentinel lymph node biopsy.   Pathology - Residual AMH suggestive of MIS at the 9-10 o'clock margin. 0/2 LN involved.    Plan:  -- Continue daily dressing changes. Shower daily as well allowing the wound to get wet  -- Failed Skin Graft Left Foot: Continue with Wound care Center for hyperbaric O2 treatment or other interventions.   -- The patient has not yet been able to set up with Rheumatology for ongoing management of immunosuppression   -- Follow-up in 3 weeks for graft check   -- Pathology suggestive of MIS at the margin (9:00-10:00). Recommended for re-excision. This will be performed after the graft heals and the inflammation subsides. Will plan for Mohs per patient discussion. This is scheduled 8/8/2024 per patient's request  --The patient asked very appropriate questions that were answered to the best of my ability with the current information at hand. He knows to call with any questions or concerns that arise    Jethro Ba MD, MPH    "

## 2024-07-23 ENCOUNTER — OFFICE VISIT (OUTPATIENT)
Dept: SURGICAL ONCOLOGY | Facility: CLINIC | Age: 69
End: 2024-07-23
Payer: MEDICARE

## 2024-07-23 VITALS
WEIGHT: 251.77 LBS | SYSTOLIC BLOOD PRESSURE: 139 MMHG | BODY MASS INDEX: 37.18 KG/M2 | RESPIRATION RATE: 18 BRPM | TEMPERATURE: 97.3 F | HEART RATE: 94 BPM | DIASTOLIC BLOOD PRESSURE: 85 MMHG

## 2024-07-23 DIAGNOSIS — C43.72 MELANOMA OF FOOT, LEFT (MULTI): ICD-10-CM

## 2024-07-23 DIAGNOSIS — T86.821: Primary | ICD-10-CM

## 2024-07-23 PROCEDURE — 1159F MED LIST DOCD IN RCRD: CPT | Performed by: SURGERY

## 2024-07-23 PROCEDURE — 99213 OFFICE O/P EST LOW 20 MIN: CPT | Performed by: SURGERY

## 2024-07-23 PROCEDURE — 3079F DIAST BP 80-89 MM HG: CPT | Performed by: SURGERY

## 2024-07-23 PROCEDURE — 3075F SYST BP GE 130 - 139MM HG: CPT | Performed by: SURGERY

## 2024-07-23 PROCEDURE — 1126F AMNT PAIN NOTED NONE PRSNT: CPT | Performed by: SURGERY

## 2024-07-23 ASSESSMENT — PAIN SCALES - GENERAL: PAINLEVEL: 0-NO PAIN

## 2024-07-23 ASSESSMENT — ENCOUNTER SYMPTOMS
OCCASIONAL FEELINGS OF UNSTEADINESS: 0
DEPRESSION: 0
LOSS OF SENSATION IN FEET: 0

## 2024-07-23 NOTE — PROGRESS NOTES
Wound Check    Referring Provider:  Dr. Jarred Ruffin MD    Chief Complaint:  Left foot melanoma    History of Present Illness:  This is a 69 y.o. 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 18 mitoses per mm2.  The patient had a lesion on his foot the was evaluated by podiatry and excised with a marginal excision demonstrating the melanoma.  He has no other lesions of concern but has not seen a dermatologist.    Surgery 4/17/2024 - Wide excision left foot melanoma with 1cm margin, full thickness skin graft, and left inguinal sentinel lymph node biopsy.   Pathology - Residual AMH suggestive of MIS at the 9-10 o'clock margin. 0/2 LN involved.    7/23/2024 - Wound Check. Has completed 20 days of Hyperbaric O2 therapy. No acute issues. Allowing the wound to air out regularly. Planning for Mohs surgery 8/8/2024 with Dr. Mancilla for the 9-10:00 margin re-excision. The wound has healed very well with hyperbaric O2. Saw Dr. William in Dermatology at [facility] recently and had 3 biopsies that have not resulted yet.       Review of Systems:  A complete 12 point review of systems was performed and is negative except as noted in the history of present illness.    Vital Signs:  Vitals:    07/23/24 1416   BP: 139/85   Pulse: 94   Resp: 18   Temp: 36.3 °C (97.3 °F)     Physical Exam:  GEN: No acute distress, Healthy appearing  HEENT: Moist mucus membranes, normocephalic  CARDS: RRR  PULM: No respiratory distress  SKIN: Left dorsal distal foot wound has contracted very well and the overlying failed skin graft was debrided off for the remainder leaving a shallow granulated base. Biopsy sites on the left anterior chest, right groin, and left anterior lower leg.  NEURO: No gross sensorimotor deficits  EXT: No significant arm or leg swelling    Laboratory Values:  Lab Results   Component Value Date    WBC 9.0 04/02/2024    HGB 15.2 04/02/2024    HCT 47.8 04/02/2024    MCV 97  "04/02/2024     04/02/2024        Chemistry    Lab Results   Component Value Date/Time     04/02/2024 1737    K 4.3 04/02/2024 1737     04/02/2024 1737    CO2 26 04/02/2024 1737    BUN 17 04/02/2024 1737    CREATININE 1.08 04/02/2024 1737    Lab Results   Component Value Date/Time    CALCIUM 10.3 04/02/2024 1737           No results found for: \"PR1\"    Assessment:  This is a 69 y.o. 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 18 mitoses per mm2.  The patient has no clinically palpable lymphadenopathy.    Surgery 4/17/2024 - Wide excision left foot melanoma with 1cm margin, full thickness skin graft, and left inguinal sentinel lymph node biopsy.   Pathology - Residual AMH suggestive of MIS at the 9-10 o'clock margin. 0/2 LN involved.    Plan:  -- Continue daily dressing changes. Shower daily as well allowing the wound to get wet  -- Failed Skin Graft Left Foot: Continue with Wound care Center for hyperbaric O2 treatment or other interventions.   -- The patient has not yet been able to set up with Rheumatology for ongoing management of immunosuppression   -- Follow-up in 3 weeks for graft check   -- Pathology suggestive of MIS at the margin (9:00-10:00). Recommended for re-excision. Planning for Mohs per patient discussion. This is scheduled 8/8/2024 per patient's request. The PHOTO from 6/11/2024 shows the area of concern marked at the 9-10:00 margin.  --The patient asked very appropriate questions that were answered to the best of my ability with the current information at hand. He knows to call with any questions or concerns that arise    Jethro Ba MD, MPH    "

## 2024-08-06 ENCOUNTER — APPOINTMENT (OUTPATIENT)
Dept: DERMATOLOGY | Facility: CLINIC | Age: 69
End: 2024-08-06
Payer: MEDICARE

## 2024-08-06 VITALS — SYSTOLIC BLOOD PRESSURE: 157 MMHG | DIASTOLIC BLOOD PRESSURE: 94 MMHG | HEART RATE: 86 BPM

## 2024-08-06 DIAGNOSIS — C43.72: ICD-10-CM

## 2024-08-06 PROCEDURE — 17311 MOHS 1 STAGE H/N/HF/G: CPT | Performed by: DERMATOLOGY

## 2024-08-06 PROCEDURE — 99204 OFFICE O/P NEW MOD 45 MIN: CPT | Performed by: DERMATOLOGY

## 2024-08-06 PROCEDURE — 88342 IMHCHEM/IMCYTCHM 1ST ANTB: CPT | Performed by: DERMATOLOGY

## 2024-08-06 NOTE — PROGRESS NOTES
Mohs Surgery Operative Note    Date of Surgery:  8/6/2024  Surgeon:  Wade Mancilla MD  Office Location: [address]   [address] [address]  Dept: [phone]  Dept Fax: [phone]  Referring Provider: Antoni Stern DPM  [id number] [place] [address]      Assessment/Plan   Pre-procedure:   Obtained informed consent: written from patient  The surgical site was identified and confirmed with the patient.     Intra-operative:   Audible time out called at : 9:23 AM 08/07/24  by: Omid Burns MA   Verified patient name, birthdate, site, specimen bottle label & requisition.    The planned procedure(s) was again reviewed with the patient. The risks of bleeding, infection, nerve damage and scarring were reviewed. Written authorization was obtained. The patient identity, surgical site, and planned procedure(s) were verified. The provider acted as both surgeon and pathologist.     Malignant melanoma of foot, left (Multi)  Left Dorsum of Foot    Mohs surgery    Consent obtained: written    Universal Protocol:  Procedure explained and questions answered to patient or proxy's satisfaction: Yes    Test results available and properly labeled: Yes    Pathology report reviewed: Yes    External notes reviewed: Yes    Photo or diagram used for site identification: Yes    Site/side marked: Yes    Slide independently reviewed by Mohs surgeon: Yes    Immediately prior to procedure a time out was called: Yes    Patient identity confirmed: verbally with patient  Preparation: Patient was prepped and draped in usual sterile fashion      Anticoagulation:  Is the patient taking prescription anticoagulant and/or aspirin prescribed/recommended by a physician? No    Was the anticoagulation regimen changed prior to Mohs? No      Anesthesia:  Anesthesia method: local infiltration  Local anesthetic: lidocaine 1% WITH epi    Procedure Details:  Biopsy accession number:  [id number]  Date of biopsy: 3/14/2024  Pre-Op diagnosis: melanoma  Melanoma subtype: in situ  Surgery side: left  Surgical site (from skin exam): Left Dorsum of Foot  Pre-operative length (cm): 2.2  Pre-operative width (cm): 0.7  Indications for Mohs surgery: anatomic location where tissue conservation is critical and aggressive histology  Previously treated? Yes    Previous treatment type: excision    Micrographic Surgery Details:  Post-operative length (cm): 2.2  Post-operative width (cm): 0.7  Number of Mohs stages: 1    Stage 1     Comments: The patient was brought into the operating room and placed in the procedure chair in the appropriate position.  The area positive by previous biopsy was identified and confirmed with the patient. The area of clinically obvious tumor was debulked using a curette and/or scalpel as needed. An incision was made following the Mohs approach through the skin. The specimen was taken to the lab, divided into 1 piece(s) and appropriately chromacoded and processed.               Tumor features identified on Mohs section: no tumor identified     Immunohistochemical stains utilized: MART-1    Depth of defect: subcutaneous fat    Patient tolerance of procedure: tolerated well, no immediate complications    Reconstruction:  Was the defect reconstructed?: No    Fine/surface layer approximation (top stitches)   Hemostasis achieved with: pressure, Gelfoam and electrodesiccation  Outcome: patient tolerated procedure well with no complications    Post-procedure details: sterile dressing applied and wound care instructions given    Dressing type: Gelfoam, pressure dressing and Ace wrap    Additional details:  Melanoma Yeison:   Curative Intent: Yes  Original Breslow Thickness: 0 mm  Clinical margin width: Other - Mohs, individual anatomic or functional considerations per the NCCN guidelines  Depth of excision: Other - Mohs, individual anatomic or functional considerations per the NCCN  guidelines  Discussion/Procedural Comments:   Repair: After a discussion with the patient regarding the options for wound closure, a decision was made to proceed with second intention healing.  Dressing F/U: Surgifoam was placed in the wound. A pressure dressing was placed to help stabilize the wound and to minimize the risk of postoperative bleeding. Wound care was discussed, and the patient was given written post-operative wound care instructions.    Staff Communication: Dermatology Local Anesthesia: 1 % Lidocaine / Epinephrine - Amount: 3.0cc      The final repair measured 2.2 x 0.7 cm            Wound care was discussed, and the patient was given written post-operative wound care instructions.      The patient will follow up with Wade Mancilla MD as needed for any post operative problems or concerns, and will follow up with their primary dermatologist as scheduled.

## 2024-08-06 NOTE — PROGRESS NOTES
Office Visit Note  Date: 8/6/2024  Surgeon:  Wade Mancilla MD  Office Location: [address]   [place]  [facility] [address]  Dept: [phone]  Dept Fax: [phone]  Referring Provider: Antoni Stern DPM  [id number] [place] [address]    Subjective   Marguerite Carrillo is a 69 y.o. male who presents for the following: MOHS Surgery    According to the patient, the lesion has been present for approximately 6 months at the time of diagnosis.  The lesion is not causing symptoms.  The lesion has not been treated previously.    The patient does not have a pacemaker / defibrillator.  The patient does not have a heart valve / joint replacement.    The patient is not on blood thinners.  The patient does not have a history of hepatitis B or C.  The patient does not have a history of HIV.  The patient does not have a history of immunosuppression (e.g. organ transplantation, malignancy, medications)    Review of Systems:  No other skin or systemic complaints other than what is documented elsewhere in the note.    MEDICAL HISTORY: clinically relevant history including significant past medical history, medications and allergies was reviewed and documented in Epic.    Objective   Well appearing patient in no apparent distress; mood and affect are within normal limits.  Vital signs: See record.  Noted on the Left Dorsum of Foot  Is a 2.2 x 0.7 cm scar        The patient confirmed the identified site.    Discussion:  The nature of the diagnosis was explained. The lesion is a skin cancer.  It has a risk of local growth and distant spread. The condition is associated with sun exposure.  Warning signs of non-melanoma skin cancer discussed. Patient was instructed to perform monthly self skin examination.  We recommended that the patient have regular full skin exams given an increased risk of subsequent skin cancers. The patient was instructed to use sun protective behaviors  including use of broad spectrum sunscreens and sun protective clothing to reduce risk of skin cancers.      Risks, benefits, side effects of Mohs surgery were discussed with patient and the patient voiced understanding.  It was explained that even though the cure rate of Mohs is very high it is not 100%. Risks of surgery including but not limited to bleeding, infection, numbness, nerve damage, and scar were reviewed.  Discussion included wound care requirements, activity restrictions, likely scar outcome and time to heal.     After Mohs surgery, the defect may need to be repaired surgically and the scar may be longer than the original lesion.  Reconstruction options, risks, and benefits were reviewed including second intention healing, linear repair (4-1 ratio was explained), local flaps, skin grafts, cartilage grafts and interpolation flaps (the need for multiple surgeries was explained). Possible outcomes were reviewed including likely scar appearance, failure of flap survival, infection, bleeding and the need for revision surgery.     The pathology was reviewed, the photograph was reviewed, and the referring physician's note was reviewed.    Patient elected for Mohs surgery.    Invasive melanoma that presents threat to patients life.  The multidisciplinary tumor board report, pathology was reviewed, the photograph was reviewed, and the referring physicians note was reviewed.  Multiple treatment options including mohs surgery (which has moderate risk of morbidity) were reviewed.    Medical Decision Making  Column 1-Invasive melanoma (1 acute illness that poses a risk to life- High)  Column 2- 4 tests reviewed (multidisciplinary tumor board, pathology, photograph, refering physician notes- Moderate)  Column 3- Modertate risk of morbidity from additional treatment- mohs surgry- Moderate)    Overall Moderate MDM

## 2024-08-06 NOTE — LETTER
MOH's Provider/Referral Letter Treatment Plan    Patient: Marguerite Carrillo   YOB: 1955   Date of Visit: 8/6/2024   MRN: [id number]     Antoni Stern DPM  [id number] [id number]    Dear Antoni Stern DPM,     I had the pleasure of seeing Marguerite Carrillo today in consultation at your request for evaluation and treatment of:  1. Malignant melanoma of foot, left (Multi)  Left Dorsum of Foot    Mohs surgery    Staff Communication: Dermatology Local Anesthesia: 1 % Lidocaine / Epinephrine - Amount: 3.0cc      Mohs surgery was indicated because of the nature of the lesion and the need to obtain the highest cure rate.  After informed consent was obtained, the patient underwent the procedure without complication.    The skin cancer was removed, wound care instructions were given and the patient was advised to follow up with you.  I will see the patient post-operatively as indicated.    Thank you very much for your confidence in me and for allowing me to share in the care of this patient.    1. Malignant melanoma of foot, left (Multi)  Left Dorsum of Foot  Is a 2.2 x 0.7 cm scar    Mohs surgery    Consent obtained: written    Universal Protocol:  Procedure explained and questions answered to patient or proxy's satisfaction: Yes    Test results available and properly labeled: Yes    Pathology report reviewed: Yes    External notes reviewed: Yes    Photo or diagram used for site identification: Yes    Site/side marked: Yes    Slide independently reviewed by Mohs surgeon: Yes    Immediately prior to procedure a time out was called: Yes    Patient identity confirmed: verbally with patient  Preparation: Patient was prepped and draped in usual sterile fashion      Anticoagulation:  Is the patient taking prescription anticoagulant and/or aspirin prescribed/recommended by a physician? No    Was the anticoagulation regimen changed prior to Mohs? No      Anesthesia:  Anesthesia method: local  infiltration  Local anesthetic: lidocaine 1% WITH epi    Procedure Details:  Biopsy accession number: [id number]  Date of biopsy: 3/14/2024  Pre-Op diagnosis: melanoma  Melanoma subtype: in situ  Surgery side: left  Surgical site (from skin exam): Left Dorsum of Foot  Pre-operative length (cm): 2.2  Pre-operative width (cm): 0.7  Indications for Mohs surgery: anatomic location where tissue conservation is critical and aggressive histology  Previously treated? Yes    Previous treatment type: excision    Micrographic Surgery Details:  Post-operative length (cm): 2.2  Post-operative width (cm): 0.7  Number of Mohs stages: 1    Stage 1     Comments: The patient was brought into the operating room and placed in the procedure chair in the appropriate position.  The area positive by previous biopsy was identified and confirmed with the patient. The area of clinically obvious tumor was debulked using a curette and/or scalpel as needed. An incision was made following the Mohs approach through the skin. The specimen was taken to the lab, divided into 1 piece(s) and appropriately chromacoded and processed.               Tumor features identified on Mohs section: no tumor identified     Immunohistochemical stains utilized: MART-1    Depth of defect: subcutaneous fat    Patient tolerance of procedure: tolerated well, no immediate complications    Reconstruction:  Was the defect reconstructed?: No    Fine/surface layer approximation (top stitches)   Hemostasis achieved with: pressure, Gelfoam and electrodesiccation  Outcome: patient tolerated procedure well with no complications    Post-procedure details: sterile dressing applied and wound care instructions given    Dressing type: Gelfoam, pressure dressing and Ace wrap    Additional details:  Melanoma Yeison:   Curative Intent: Yes  Original Breslow Thickness: 0 mm  Clinical margin width: Other - Mohs, individual anatomic or functional considerations per the NCCN guidelines  Depth  of excision: Other - Mohs, individual anatomic or functional considerations per the NCCN guidelines  Discussion/Procedural Comments:   Repair: After a discussion with the patient regarding the options for wound closure, a decision was made to proceed with second intention healing.  Dressing F/U: Surgifoam was placed in the wound. A pressure dressing was placed to help stabilize the wound and to minimize the risk of postoperative bleeding. Wound care was discussed, and the patient was given written post-operative wound care instructions.    Staff Communication: Dermatology Local Anesthesia: 1 % Lidocaine / Epinephrine - Amount: 3.0cc           Sincerely,       Wade Mancilla MD  [facility]

## 2024-08-11 DIAGNOSIS — C43.72 MELANOMA OF FOOT, LEFT (MULTI): Primary | ICD-10-CM

## 2024-08-11 NOTE — TUMOR BOARD NOTE
General Patient Information  Name:  Marguerite Carrillo  Evaluation #:  2  Conference Date:  8/12/2024  YOB: 1955  MRN:  [id number]  Program Physician(s):  Wade Mancilla  Referring Physician(s):  Antoni Stern (Podiatry), Jethro Ba      Summary   Stage:  pIIA ([id number])   Melanoma 5 year survival: 94%    Assessment:  Left foot with an ulcerated acral melanoma, Breslow: 1.9 mm, with neurotropism.  S/p WLE with 1 cm margins showing an atypical melanocytic hyperplasia suggestive of residual melanoma in situ, present on the peripheral margin, the 9:00 to 10:00 margin appears to be involved, and benign SLNB (0/2).    S/p one stage of Mohs surgery with complete clearance. The debulking layer was negative for disease. Adequately surgically treated.     Recommendation: Annual H&P    Review Multidisciplinary Cutaneous Oncology Conference recommendation with patient.  Continue routine follow up and total body skin exams with Dermatology.    Follow Up:  Jethro Ba, Dermatology      History and Physical Exam  Dermatologic History:   69 y.o. male with a biopsy of the left foot on 3- showing an ulcerated melanoma, acral type, Breslow: 1.9 mm, with neurotropism.  S/p on 4- WLE with 1 cm margins showing an atypical melanocytic hyperplasia suggestive of residual melanoma in situ, present on the peripheral margin, the 9:00 to 10:00 margin appears to be involved, and left inguinal SLNB with focal Melan-A staining, benign node favored (0/2).    Past Medical History:    Past Medical History:   Diagnosis Date    Class 2 obesity with body mass index (BMI) of 36.0 to 36.9 in adult 07/26/2023    Diabetic neuropathy     HLD (hyperlipidemia)     HTN (hypertension)     Insulin pump in place     Melanoma of foot, left     Rheumatoid arthritis     Sciatica, right side     Type 1 diabetes        Family History of DNS/MM:  Unknown    Skin:  Left dorsal distal foot incision at the lateral aspect over top of the fifth  "metatarsal.  This is obliquely oriented.  No evidence for residual disease.    Lymphatic:  No palpable lymphadenopathy the left popliteal and inguinal basins.      Pathology  Dermatopathology- DERM LAB: [id number]  Collected 4/17/2024 10:20    A. SKIN, LEFT FOOT MELANOMA, SHORT PROXIMAL 12:00, LONG LATERAL 3:00, WIDE EXCISION:  ATYPICAL MELANOCYTIC HYPERPLASIA SUGGESTIVE OF RESIDUAL MELANOMA IN SITU, PRESENT ON THE PERIPHERAL MARGIN, SEE NOTE.     Note: Microscopic examination reveals a specimen that extends into the subcutaneous fat. An area with horizontally oriented collagen and vertically oriented vessels is present. In slides A3 and A4 there are increased single melanocytes along the dermal-epidermal junction seen on a SOX-10. These melanocytes stain with antibodies against PRAME. There is no significant pagetoid extension and the melanocytes have mildly enlarged nuclei. All control slides stain appropriately.     The 9:00 to 10:00 margin appears to be involved.      B. NODE, LEFT INGUINAL SENTINEL LYMPH NODE #1 COUNT 237, BIOPSY:  FOCAL MELAN-A STAINING, SEE NOTE.     Note: Microscopic examination reveals an enlarged lymph node. In slide B1 in the subcapsular space there is focal Melan-A staining in two separate sections that is not seen on the SOX-10 or HMB45 stain. One area appears to be artifact. The other has \"U\" shaped staining around a nucleus. The primary melanoma in [id number] was reviewed and a benign lymph node is favored.  This case was reviewed with Dr. Lilly Dasilva who concurs with the diagnosis.     C. NODE, LEFT INGUINAL SENTINEL LYMPH NODE #2 COUNT 63, BIOPSY:  BENIGN LYMPH NODE.     ** Electronically signed out by Mauricio Mtz MD **    C. Microscopic examination reveals a lymph node. No melanoma is seen on H&E. Melan A, Sox10, and HMB45 stains are unremarkable. All control slides stain " appropriately.    _____________________________________________________________________________________________________________    Derm Consult: [id number]  4 SLIDES, [facility], #[id number] (BX: 03/14/2024)      SKIN, LEFT FOOT, EXCISION:  FOCALLY ULCERATED MALIGNANT MELANOMA, BRESLOW THICKNESS 1.9 MM, SEE NOTE.     Note: Microscopic examination reveals a specimen that extends into the subcutaneous fat. In slides A3 and A4 there is a fairy well circumscribed proliferation of nested and single atypical melanocytes throughout all layers of the epidermis and within the dermis. There appears to be focal neurotropism. The melanocytes have moderately enlarged nuclei with moderate cytoplasm.      ** Electronically signed out by Mauricio Mtz MD **    Procedure  Excision   Specimen Laterality  Left     Tumor Site  Skin of lower limb and hip: Left foot        Histologic Type  Acral melanoma   Maximum Tumor (Breslow) Thickness (Millimeters)  1.9 mm   Ulceration  Present   Extent of Ulceration (Millimeters)  0.1 mm   Anatomic (Fredy) Level  IV (melanoma invades reticular dermis)   Mitotic Rate  5 mitoses per mm2   Microsatellite(s)  Not identified   Lymphovascular Invasion  Not identified   Neurotropism  Present   Tumor-Infiltrating Lymphocytes  Present, nonbrisk   Tumor Regression  Not identified   MARGINS     Margin Status for Invasive Melanoma  All margins negative for invasive melanoma   Margin Status for Melanoma in situ  All margins negative for melanoma in situ   Margin Comment  Invasive melanoma and melanoma in situ are free of the margins in planes of sections examined.   PATHOLOGIC STAGE CLASSIFICATION (pTNM, AJCC 8th Edition)     pT Category  pT2b

## 2024-08-12 ENCOUNTER — TUMOR BOARD CONFERENCE (OUTPATIENT)
Dept: HEMATOLOGY/ONCOLOGY | Facility: HOSPITAL | Age: 69
End: 2024-08-12
Payer: MEDICARE

## 2024-09-02 DIAGNOSIS — E10.42 TYPE 1 DIABETES MELLITUS WITH DIABETIC POLYNEUROPATHY (MULTI): ICD-10-CM

## 2024-09-03 RX ORDER — SEMAGLUTIDE 0.68 MG/ML
INJECTION, SOLUTION SUBCUTANEOUS
Qty: 3 ML | Refills: 6 | Status: SHIPPED | OUTPATIENT
Start: 2024-09-03

## 2024-10-02 DIAGNOSIS — E10.42 TYPE 1 DIABETES MELLITUS WITH DIABETIC POLYNEUROPATHY: ICD-10-CM

## 2024-10-02 RX ORDER — BLOOD SUGAR DIAGNOSTIC
STRIP MISCELLANEOUS
Qty: 500 STRIP | Refills: 1 | Status: SHIPPED | OUTPATIENT
Start: 2024-10-02

## 2024-10-15 ENCOUNTER — OFFICE VISIT (OUTPATIENT)
Dept: SURGICAL ONCOLOGY | Facility: CLINIC | Age: 69
End: 2024-10-15
Payer: COMMERCIAL

## 2024-10-15 VITALS
HEART RATE: 97 BPM | SYSTOLIC BLOOD PRESSURE: 144 MMHG | DIASTOLIC BLOOD PRESSURE: 85 MMHG | RESPIRATION RATE: 16 BRPM | WEIGHT: 247.5 LBS | BODY MASS INDEX: 36.55 KG/M2 | TEMPERATURE: 98.1 F

## 2024-10-15 DIAGNOSIS — C43.72 MELANOMA OF FOOT, LEFT (MULTI): ICD-10-CM

## 2024-10-15 DIAGNOSIS — T86.821: Primary | ICD-10-CM

## 2024-10-15 PROCEDURE — 99213 OFFICE O/P EST LOW 20 MIN: CPT | Performed by: SURGERY

## 2024-10-15 PROCEDURE — 1159F MED LIST DOCD IN RCRD: CPT | Performed by: SURGERY

## 2024-10-15 PROCEDURE — 3079F DIAST BP 80-89 MM HG: CPT | Performed by: SURGERY

## 2024-10-15 PROCEDURE — 1126F AMNT PAIN NOTED NONE PRSNT: CPT | Performed by: SURGERY

## 2024-10-15 PROCEDURE — 3077F SYST BP >= 140 MM HG: CPT | Performed by: SURGERY

## 2024-10-15 ASSESSMENT — PAIN SCALES - GENERAL: PAINLEVEL: 0-NO PAIN

## 2024-10-15 ASSESSMENT — ENCOUNTER SYMPTOMS
OCCASIONAL FEELINGS OF UNSTEADINESS: 0
LOSS OF SENSATION IN FEET: 0
DEPRESSION: 0

## 2024-10-15 NOTE — PROGRESS NOTES
Wound Check    Referring Provider:  Dr. Jarred Ruffin MD    Chief Complaint:  Left foot melanoma    History of Present Illness:  This is a 69 y.o. 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 18 mitoses per mm2.  The patient had a lesion on his foot the was evaluated by podiatry and excised with a marginal excision demonstrating the melanoma.  He has no other lesions of concern but has not seen a dermatologist.    Surgery 4/17/2024 - Wide excision left foot melanoma with 1cm margin, full thickness skin graft, and left inguinal sentinel lymph node biopsy.   Pathology - Residual AMH suggestive of MIS at the 9-10 o'clock margin. 0/2 LN involved.    10/153/2024 - Wound Check.  The patient's wound has healed very well even after his Mohs surgery.  Unfortunately, he developed Bell's palsy, hearing loss, and a macular hemorrhage according to his report and outside records.  The patient attributes this to the hyperbaric treatments.  He states that all of the symptoms are improving since he stopped the treatments in August.  He did complete the margin excision in August as well and is seeing dermatology who has biopsied other nonmalignant sites.      Review of Systems:  A complete 12 point review of systems was performed and is negative except as noted in the history of present illness.    Vital Signs:  Vitals:    10/15/24 1403   BP: 144/85   Pulse: 97   Resp: 16   Temp: 36.7 °C (98.1 °F)     Physical Exam:  GEN: No acute distress, Healthy appearing  HEENT: Moist mucus membranes, normocephalic  CARDS: RRR  PULM: No respiratory distress  LYMPH: No palpable lymphadenopathy in the left inguinal or popliteal basins  SKIN: Left dorsal distal foot wound has a scab overtop that is very superficial, the surrounding skin has contrated well. No lesions of concern.   NEURO: No gross sensorimotor deficits  EXT: No significant arm or leg swelling    Laboratory Values:  Lab Results  "  Component Value Date    WBC 9.0 04/02/2024    HGB 15.2 04/02/2024    HCT 47.8 04/02/2024    MCV 97 04/02/2024     04/02/2024        Chemistry    Lab Results   Component Value Date/Time     04/02/2024 1737    K 4.3 04/02/2024 1737     04/02/2024 1737    CO2 26 04/02/2024 1737    BUN 17 04/02/2024 1737    CREATININE 1.08 04/02/2024 1737    Lab Results   Component Value Date/Time    CALCIUM 10.3 04/02/2024 1737           No results found for: \"PR1\"    Assessment:  This is a 69 y.o. 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 18 mitoses per mm2.  The patient has no clinically palpable lymphadenopathy.    Surgery 4/17/2024 - Wide excision left foot melanoma with 1cm margin, full thickness skin graft, and left inguinal sentinel lymph node biopsy.   Pathology - Residual AMH suggestive of MIS at the 9-10 o'clock margin. 0/2 LN involved.    Margin cleared with Mohs 8/8/2024    Plan:  -- Melanoma has been adequately treated. Continue with Dermatology for Total body skin exams.  -- The patient has not yet been able to set up with Rheumatology for ongoing management of immunosuppression but since he has stopped his immunosuppression for RA months ago, he has not had any arthritis issues.   --The patient asked very appropriate questions that were answered to the best of my ability with the current information at hand. He knows to call with any questions or concerns that arise    Jethro Ba MD, MPH    "

## 2024-10-19 DIAGNOSIS — E10.42 TYPE 1 DIABETES MELLITUS WITH DIABETIC POLYNEUROPATHY: ICD-10-CM

## 2024-10-21 RX ORDER — INSULIN DEGLUDEC 100 U/ML
INJECTION, SOLUTION SUBCUTANEOUS
Qty: 120 ML | Refills: 11 | Status: SHIPPED | OUTPATIENT
Start: 2024-10-21

## 2024-11-04 ENCOUNTER — APPOINTMENT (OUTPATIENT)
Dept: ENDOCRINOLOGY | Facility: CLINIC | Age: 69
End: 2024-11-04
Payer: MEDICARE

## 2024-11-04 NOTE — PROGRESS NOTES
HPI   70 yo with Diabetes 1 (dx 2003), HTN, dyslipidemia, melanoma L foot in 2024, previously of Dr. Arroyo's practice. A1c was 6.3%, today 6.7%.     Pt is testing sugars 4-5 times per day using dexcom G6. Pt is having low sugars 0-1 times/week. Pt is following a carb controlled diet and knows reasonable carb allowances. Pt is able to afford their medications. Pt is not all that active.           Pt is taking tresiba 60 units bid and humalog 30-35 units at meals, 15 units with snack. Ozempic 0.5mg weekly (c/o constipation, indigestion, BM every other day, using stool softener)     Pt taking toprol 25mg bid, aldactone 25 mg for htn.           Pt is not on a statin, states feeling achy/weak when tried in the past.           30 day dexcom data: 82% in range, 0% low, pattern: mid 100's through most of the day, occ excursions after meal      Current Outpatient Medications:     acetaminophen (Tylenol) 325 mg tablet, Take 2 tablets (650 mg) by mouth every 6 hours if needed for mild pain (1 - 3), Disp: 40 tablet, Rfl: 1    alcohol swabs pads, medicated, Test 5 times per day, Disp: 500 each, Rfl: 1    allopurinol (Zyloprim) 300 mg tablet, Take 0.5 tablets (150 mg) by mouth once daily., Disp: , Rfl:     Autolet lancing device, Use to test blood sugars 5 times per day. Long term current use of insulin Dx: E 10.42 for 90 days , Disp: , Rfl:     Basaglar KwikPen U-100 Insulin 100 unit/mL (3 mL) pen, INJECT 60 UNITS UNDER THE SKIN TWO TIMES A DAY, Disp: 105 mL, Rfl: 6    brimonidine (AlphaGAN P) 0.15 % ophthalmic solution, Administer 1 drop into the right eye 2 times a day., Disp: , Rfl:     buPROPion XL (Wellbutrin XL) 300 mg 24 hr tablet, Take 1 tablet (300 mg) by mouth once daily., Disp: , Rfl:     folic acid (Folvite) 1 mg tablet, Take 1 tablet (1 mg) by mouth once daily., Disp: , Rfl:     folic acid 20 mg capsule, Take by mouth.  Folic Acid CAPS Refills: 0 Active, Disp: , Rfl:     ibuprofen 600 mg tablet, Take 1 tablet (600  "mg) by mouth every 6 hours if needed for moderate pain (4 - 6), Disp: 40 tablet, Rfl: 0    insulin degludec (Tresiba FlexTouch U-100) 100 unit/mL (3 mL) injection, INJECT 120 UNITS UNDER THE SKIN ONCE DAILY AS DIRECTED, Disp: 120 mL, Rfl: 11    insulin lispro (HumaLOG KwikPen Insulin) 100 unit/mL injection, INJECT UP  UNITS UNDER THE SKIN DAILY AS DIRECTED, Disp: 150 mL, Rfl: 1    latanoprost (Xalatan) 0.005 % ophthalmic solution, Administer 1 drop into both eyes once daily at bedtime., Disp: , Rfl:     methotrexate (Trexall) 2.5 mg tablet, Take 10 tablets (25 mg total) by mouth 1 (one) time per week.   ten tablets one time per week on Monday Orally as directed, Disp: , Rfl:     metoprolol tartrate (Lopressor) 25 mg tablet, Take 1 tablet (25 mg) by mouth 2 times a day., Disp: , Rfl:     OneTouch Ultra Test strip, TEST 5 TIMES PER DAY, Disp: 500 strip, Rfl: 1    Ozempic 0.25 mg or 0.5 mg (2 mg/3 mL) pen injector, INJECT 0.5MG UNDER THE SKIN ONCE A WEEK, Disp: 3 mL, Rfl: 6    pen needle, diabetic (BD Ultra-Fine Short Pen Needle) 31 gauge x 5/16\" needle, USE 5 TIMES A DAY AS DIRECTED, Disp: 500 each, Rfl: 3    spironolactone (Aldactone) 25 mg tablet, Take 1 tablet (25 mg) by mouth once daily., Disp: , Rfl:     tocilizumab (Actemra),  0.9 ml Subcutaneous once every 10 days, Disp: , Rfl:     traMADol (Ultram) 50 mg tablet, Take 1 tablet (50 mg) by mouth every 6 hours if needed for severe pain (7 - 10) for up to 12 doses., Disp: 12 tablet, Rfl: 0    ARIPiprazole (Abilify) 10 mg tablet, Take 1 tablet (10 mg) by mouth once daily., Disp: , Rfl:     Allergies as of 11/06/2024 - Reviewed 11/06/2024   Allergen Reaction Noted    Lisinopril Unknown 06/22/2015    Simvastatin Unknown 08/26/2023       BP (!) 142/92 (BP Location: Right arm, Patient Position: Sitting)   Pulse 81   Wt 111 kg (245 lb 6.4 oz)   BMI 36.24 kg/m²     Labs:   Lab Results   Component Value Date    WBC 9.0 04/02/2024    NRBC 0.0 04/02/2024    RBC 4.93 " "04/02/2024    HGB 15.2 04/02/2024    HCT 47.8 04/02/2024     04/02/2024     Lab Results   Component Value Date    CALCIUM 10.3 04/02/2024     04/02/2024    K 4.3 04/02/2024     04/02/2024    CO2 26 04/02/2024    ANIONGAP 13 04/02/2024    BUN 17 04/02/2024    CREATININE 1.08 04/02/2024    GLUCOSE 112 (H) 04/02/2024    EGFR 74 04/02/2024     No results found for: \"CHOL\", \"TRIG\", \"HDL\", \"LDLCALC\"  No results found for: \"MICROALBCREA\"  No results found for: \"TSH\"  No results found for: \"WFLHZEOC73\"  Lab Results   Component Value Date    HGBA1C 6.7 (A) 11/06/2024         Assessment/Plan   1. Type 1 diabetes mellitus with diabetic polyneuropathy  -A1c ordered and reviewed  -labs reviewed  -dexcom clarity data reviewed with patient (scanned in epic), reviewed glycemic targets, reinforced looking for patterns and trends    -overall doing well  -c/o constipation with ozempic 0.5 mg weekly; decrease to 0.25 mg, if constipation does not improve - stop  -reviewed use of dexcom G7, we could supply pt with sample reader if he decides to change      2. Essential (primary) hypertension  -above target   -start amlodipine 5 mg, stop if swelling    3. Mixed hyperlipidemia  -does not tolerate statin  -start zetia 10 mg daily  -start generic lovaza 2 grams bid  -if above not working, add fibrate at follow up, also consider PSK9 with  PAP        Follow up: Pharmacist 4 months    -labs/tests/notes reviewed  -reviewed and counseled patient on medication monitoring and side effects    Treatment and plan discussed with Dr. Shen. Topher RN [facility] and     Medical Decision Making  Complexity of problem: Chronic illness of diabetes mellitus uncontrolled, progressing  Data analyzed and reviewed: Reviewed prior notes, blood glucose data, labs including HgbA1c, lipids, serum chemistries.  Ordered tests.   Risk of complications and morbidities: Is definite because of use of insulin and risk " of hypoglycemia.  Prescription medications reviewed and modifications made.  Compliance assessed.  Addressed social determinants of health including food insecurity.

## 2024-11-06 ENCOUNTER — CLINICAL SUPPORT (OUTPATIENT)
Dept: ENDOCRINOLOGY | Facility: CLINIC | Age: 69
End: 2024-11-06
Payer: MEDICARE

## 2024-11-06 VITALS
SYSTOLIC BLOOD PRESSURE: 142 MMHG | WEIGHT: 245.4 LBS | BODY MASS INDEX: 36.24 KG/M2 | HEART RATE: 81 BPM | DIASTOLIC BLOOD PRESSURE: 92 MMHG

## 2024-11-06 DIAGNOSIS — E10.42 TYPE 1 DIABETES MELLITUS WITH DIABETIC POLYNEUROPATHY: ICD-10-CM

## 2024-11-06 DIAGNOSIS — E78.2 MIXED HYPERLIPIDEMIA: ICD-10-CM

## 2024-11-06 DIAGNOSIS — I10 ESSENTIAL (PRIMARY) HYPERTENSION: ICD-10-CM

## 2024-11-06 LAB — POC HEMOGLOBIN A1C: 6.7 % (ref 4.2–6.5)

## 2024-11-06 PROCEDURE — 83036 HEMOGLOBIN GLYCOSYLATED A1C: CPT | Performed by: INTERNAL MEDICINE

## 2024-11-06 PROCEDURE — 95251 CONT GLUC MNTR ANALYSIS I&R: CPT | Performed by: INTERNAL MEDICINE

## 2024-11-06 PROCEDURE — 99214 OFFICE O/P EST MOD 30 MIN: CPT | Performed by: INTERNAL MEDICINE

## 2024-11-06 RX ORDER — BUPROPION HYDROCHLORIDE 300 MG/1
300 TABLET ORAL
COMMUNITY
Start: 2024-10-31 | End: 2025-01-29

## 2024-11-06 RX ORDER — AMLODIPINE BESYLATE 5 MG/1
5 TABLET ORAL DAILY
Qty: 30 TABLET | Refills: 11 | Status: SHIPPED | OUTPATIENT
Start: 2024-11-06 | End: 2025-11-06

## 2024-11-06 RX ORDER — ARIPIPRAZOLE 10 MG/1
10 TABLET ORAL DAILY
COMMUNITY

## 2024-11-06 RX ORDER — OMEGA-3-ACID ETHYL ESTERS 1 G/1
2 CAPSULE, LIQUID FILLED ORAL 2 TIMES DAILY
Qty: 360 CAPSULE | Refills: 3 | Status: SHIPPED | OUTPATIENT
Start: 2024-11-06 | End: 2025-11-06

## 2024-11-06 RX ORDER — EZETIMIBE 10 MG/1
10 TABLET ORAL DAILY
Qty: 90 TABLET | Refills: 1 | Status: SHIPPED | OUTPATIENT
Start: 2024-11-06

## 2024-11-06 ASSESSMENT — PAIN SCALES - GENERAL: PAINLEVEL_OUTOF10: 0-NO PAIN

## 2024-11-06 NOTE — PATIENT INSTRUCTIONS
Decrease ozempic to 0.25 mg weekly  If constipation does not improve over the next month, stop    Start amlodipine 5 mg for blood pressure  Stop if get swelling in legs    Start Zetia 10 mg for high cholesterol  Start prescription fish oil 2 capsules 2 times a day for high triglycerides    Go for fasting labwork and urine 1-2 weeks before next visit  Go to any  lab, requisition in system

## 2024-11-06 NOTE — PROGRESS NOTES
Attestation signed by Cristian Shen MD on 11/6/24 at 12:56 PM.    I, Dr Cristian Shen, have reviewed this progress note, medication list, vital signs, any pertinent lab values, and any CGM data if present with the [facility] and  face to face during this visit today. This note reflects the treatment plan that was made under my direction after reviewing the above mentioned elements while face to face with the patient and CDE.  I personally answered and addressed any questions and concerns the patient had during the visit today.  The CDE entered the data in this note under my direction and I personally reviewed it, signed any lab or medication orders that I instructed to be completed. I am the billing provider for this visit and the level of service was determined by my involvement in the Medical Decision Making Component of this visit while face to face with the patient.

## 2024-12-01 DIAGNOSIS — E10.42 TYPE 1 DIABETES MELLITUS WITH DIABETIC POLYNEUROPATHY: ICD-10-CM

## 2024-12-01 RX ORDER — ISOPROPYL ALCOHOL 70 ML/100ML
SWAB TOPICAL
Qty: 500 EACH | Refills: 3 | Status: SHIPPED | OUTPATIENT
Start: 2024-12-01 | End: 2024-12-04 | Stop reason: SDUPTHER

## 2024-12-04 DIAGNOSIS — E10.42 TYPE 1 DIABETES MELLITUS WITH DIABETIC POLYNEUROPATHY: ICD-10-CM

## 2024-12-04 RX ORDER — ISOPROPYL ALCOHOL 70 ML/100ML
SWAB TOPICAL
Qty: 500 EACH | Refills: 3 | Status: SHIPPED | OUTPATIENT
Start: 2024-12-04

## 2024-12-10 DIAGNOSIS — E10.42 TYPE 1 DIABETES MELLITUS WITH DIABETIC POLYNEUROPATHY: ICD-10-CM

## 2024-12-10 RX ORDER — INSULIN LISPRO 100 [IU]/ML
INJECTION, SOLUTION INTRAVENOUS; SUBCUTANEOUS
Qty: 150 ML | Refills: 1 | Status: SHIPPED | OUTPATIENT
Start: 2024-12-10

## 2025-02-16 DIAGNOSIS — E10.42 TYPE 1 DIABETES MELLITUS WITH DIABETIC POLYNEUROPATHY: ICD-10-CM

## 2025-02-17 RX ORDER — SEMAGLUTIDE 0.68 MG/ML
INJECTION, SOLUTION SUBCUTANEOUS
Qty: 9 ML | Refills: 1 | Status: SHIPPED | OUTPATIENT
Start: 2025-02-17

## 2025-03-03 DIAGNOSIS — Z79.4 TYPE 2 DIABETES MELLITUS WITH OTHER SPECIFIED COMPLICATION, WITH LONG-TERM CURRENT USE OF INSULIN: ICD-10-CM

## 2025-03-03 DIAGNOSIS — E11.69 TYPE 2 DIABETES MELLITUS WITH OTHER SPECIFIED COMPLICATION, WITH LONG-TERM CURRENT USE OF INSULIN: ICD-10-CM

## 2025-03-03 RX ORDER — PEN NEEDLE, DIABETIC 30 GX3/16"
NEEDLE, DISPOSABLE MISCELLANEOUS
Qty: 500 EACH | Refills: 3 | Status: SHIPPED | OUTPATIENT
Start: 2025-03-03

## 2025-03-11 ENCOUNTER — APPOINTMENT (OUTPATIENT)
Dept: ENDOCRINOLOGY | Facility: CLINIC | Age: 70
End: 2025-03-11
Payer: MEDICARE

## 2025-03-11 VITALS
HEART RATE: 76 BPM | SYSTOLIC BLOOD PRESSURE: 138 MMHG | HEIGHT: 69 IN | DIASTOLIC BLOOD PRESSURE: 86 MMHG | BODY MASS INDEX: 37.92 KG/M2 | WEIGHT: 256 LBS

## 2025-03-11 DIAGNOSIS — E10.42 TYPE 1 DIABETES MELLITUS WITH DIABETIC POLYNEUROPATHY: Primary | ICD-10-CM

## 2025-03-11 DIAGNOSIS — I10 ESSENTIAL (PRIMARY) HYPERTENSION: ICD-10-CM

## 2025-03-11 DIAGNOSIS — E78.2 MIXED HYPERLIPIDEMIA: ICD-10-CM

## 2025-03-11 LAB — POC HEMOGLOBIN A1C: 6.6 % (ref 4.2–6.5)

## 2025-03-11 PROCEDURE — 99214 OFFICE O/P EST MOD 30 MIN: CPT | Performed by: INTERNAL MEDICINE

## 2025-03-11 PROCEDURE — 83036 HEMOGLOBIN GLYCOSYLATED A1C: CPT | Performed by: INTERNAL MEDICINE

## 2025-03-11 PROCEDURE — 95251 CONT GLUC MNTR ANALYSIS I&R: CPT | Performed by: INTERNAL MEDICINE

## 2025-03-11 RX ORDER — INSULIN LISPRO 100 [IU]/ML
INJECTION, SOLUTION INTRAVENOUS; SUBCUTANEOUS
Qty: 45 ML | Refills: 5 | Status: SHIPPED | OUTPATIENT
Start: 2025-03-11

## 2025-03-11 RX ORDER — INSULIN DEGLUDEC 100 U/ML
INJECTION, SOLUTION SUBCUTANEOUS
Qty: 120 ML | Refills: 5 | Status: SHIPPED | OUTPATIENT
Start: 2025-03-11

## 2025-03-11 RX ORDER — BLOOD SUGAR DIAGNOSTIC
STRIP MISCELLANEOUS
Qty: 150 STRIP | Refills: 5 | Status: SHIPPED | OUTPATIENT
Start: 2025-03-11 | End: 2025-03-12 | Stop reason: SDUPTHER

## 2025-03-11 RX ORDER — ISOPROPYL ALCOHOL 70 ML/100ML
SWAB TOPICAL
Qty: 300 EACH | Refills: 5 | Status: SHIPPED | OUTPATIENT
Start: 2025-03-11 | End: 2025-03-12 | Stop reason: SDUPTHER

## 2025-03-11 RX ORDER — BLOOD-GLUCOSE CONTROL, NORMAL
EACH MISCELLANEOUS
Qty: 150 EACH | Refills: 5 | Status: SHIPPED | OUTPATIENT
Start: 2025-03-11 | End: 2025-03-12 | Stop reason: SDUPTHER

## 2025-03-11 NOTE — PROGRESS NOTES
Subjective   Patient ID: Marguerite Carrillo is a 70 y.o. male who presents for Diabetes.  HPI  69 yo with Diabetes 1 (dx 2003), HTN, dyslipidemia, melanoma L foot in 2024, previously of Dr. Arroyo's practice. A1c was 6.7%, today 6.6%.     Pt is testing sugars 4-5 times per day using dexcom G6. Pt is having low sugars 0-1 times/week. Pt is following a carb controlled diet and knows reasonable carb allowances. Pt is able to afford their medications. Pt is not all that active.          Taking tresiba 60 units bid and humalog 40 units at meals (3-4 times a day).  -Stopped Ozempic d/t constipation, indigestion - did not resolve / improve at lower dose, has now resolved off Ozempic.     Pt taking amlodipine 5mg daily (new last visit), metoprolol tartrate 25mg bid, and aldactone 25 mg for htn.          Pt is not on a statin, states feeling achy/weak when tried in the past - currently taking Vascepa 0.5g and Zetia 10mg daily for lipids.            30 day dexcom data: 74% in range, 0% low, pattern: mid 100's through most of the day, occ excursions after meal    Current Outpatient Medications:     acetaminophen (Tylenol) 325 mg tablet, Take 2 tablets (650 mg) by mouth every 6 hours if needed for mild pain (1 - 3), Disp: 40 tablet, Rfl: 1    allopurinol (Zyloprim) 300 mg tablet, Take 0.5 tablets (150 mg) by mouth once daily., Disp: , Rfl:     amLODIPine (Norvasc) 5 mg tablet, Take 1 tablet (5 mg) by mouth once daily., Disp: 30 tablet, Rfl: 11    ARIPiprazole (Abilify) 10 mg tablet, Take 1 tablet (10 mg) by mouth once daily., Disp: , Rfl:     Autolet lancing device, Use to test blood sugars 5 times per day. Long term current use of insulin Dx: E 10.42 for 90 days , Disp: , Rfl:     Basaglar KwikPen U-100 Insulin 100 unit/mL (3 mL) pen, INJECT 60 UNITS UNDER THE SKIN TWO TIMES A DAY, Disp: 105 mL, Rfl: 6    brimonidine (AlphaGAN P) 0.15 % ophthalmic solution, Administer 1 drop into the right eye 2 times a day., Disp: , Rfl:      "ezetimibe (Zetia) 10 mg tablet, Take 1 tablet (10 mg) by mouth once daily., Disp: 90 tablet, Rfl: 1    folic acid (Folvite) 1 mg tablet, Take 1 tablet (1 mg) by mouth once daily., Disp: , Rfl:     folic acid 20 mg capsule, Take by mouth.  Folic Acid CAPS Refills: 0 Active, Disp: , Rfl:     ibuprofen 600 mg tablet, Take 1 tablet (600 mg) by mouth every 6 hours if needed for moderate pain (4 - 6), Disp: 40 tablet, Rfl: 0    latanoprost (Xalatan) 0.005 % ophthalmic solution, Administer 1 drop into both eyes once daily at bedtime., Disp: , Rfl:     methotrexate (Trexall) 2.5 mg tablet, Take 10 tablets (25 mg total) by mouth 1 (one) time per week.   ten tablets one time per week on Monday Orally as directed, Disp: , Rfl:     metoprolol tartrate (Lopressor) 25 mg tablet, Take 1 tablet (25 mg) by mouth 2 times a day., Disp: , Rfl:     omega-3 acid ethyl esters (Lovaza) 1 gram capsule, Take 2 capsules (2 g) by mouth 2 times a day., Disp: 360 capsule, Rfl: 3    pen needle, diabetic (BD Ultra-Fine Short Pen Needle) 31 gauge x 5/16\" needle, Use five times daily, Disp: 500 each, Rfl: 3    spironolactone (Aldactone) 25 mg tablet, Take 1 tablet (25 mg) by mouth once daily., Disp: , Rfl:     tocilizumab (Actemra),  0.9 ml Subcutaneous once every 10 days, Disp: , Rfl:     traMADol (Ultram) 50 mg tablet, Take 1 tablet (50 mg) by mouth every 6 hours if needed for severe pain (7 - 10) for up to 12 doses., Disp: 12 tablet, Rfl: 0    alcohol swabs (BD Alcohol Swabs) pads, medicated, Use before insulin injections and blood sugar testing up to 10 times a day., Disp: 300 each, Rfl: 5    buPROPion XL (Wellbutrin XL) 300 mg 24 hr tablet, Take 1 tablet (300 mg) by mouth once daily., Disp: , Rfl:     insulin degludec (Tresiba FlexTouch U-100) 100 unit/mL (3 mL) pen, INJECT 120 UNITS UNDER THE SKIN ONCE DAILY AS DIRECTED, Disp: 120 mL, Rfl: 5    insulin lispro (HumaLOG KwikPen Insulin) 100 unit/mL pen, INJECT 3 TIMES A DAY, UP  UNITS A " "DAY AS DIRECTED, Disp: 45 mL, Rfl: 5    lancets (OneTouch UltraSoft 2 Lancet) 30 gauge misc, Test blood sugar up to 5 times a day as directed., Disp: 150 each, Rfl: 5    OneTouch Ultra Test strip, Test blood sugar up to 5 times a day as directed., Disp: 150 strip, Rfl: 5    Ozempic 0.25 mg or 0.5 mg (2 mg/3 mL) pen injector, INJECT 0.5MG UNDER THE SKIN ONCE A WEEK (Patient not taking: Reported on 3/11/2025), Disp: 9 mL, Rfl: 1   Allergies   Allergen Reactions    Lisinopril Unknown    Simvastatin Unknown       Review of Systems  See HPI.     Objective   /86   Pulse 76   Ht 1.753 m (5' 9\")   Wt 116 kg (256 lb)   BMI 37.80 kg/m²     Labs:   Lab Results   Component Value Date    HGBA1C 6.6 (A) 03/11/2025     Lab Results   Component Value Date    CALCIUM 10.3 04/02/2024     04/02/2024    K 4.3 04/02/2024     04/02/2024    CO2 26 04/02/2024    ANIONGAP 13 04/02/2024    BUN 17 04/02/2024    CREATININE 1.08 04/02/2024    GLUCOSE 112 (H) 04/02/2024    EGFR 74 04/02/2024     Lab Results   Component Value Date    WBC 9.0 04/02/2024    NRBC 0.0 04/02/2024    RBC 4.93 04/02/2024    HGB 15.2 04/02/2024    HCT 47.8 04/02/2024     04/02/2024     No results found for: \"CHOL\", \"TRIG\", \"HDL\", \"LDLCALC\", \"LDLDIRECT\"  No results found for: \"TPUC\", \"CREATU\", \"UTPCR\", \"EDL67VWP\", \"MICROALBCREA\", \"CRDOIQZP28V\"  No results found for: \"TSH\"  No results found for: \"DXZFRXSQ52\"  No results found for: \"VITD25\"  No results found for: \"PTH\"  No results found for: \"MG\"    Assessment    1. Type 1 diabetes mellitus with diabetic polyneuropathy    2. Essential (primary) hypertension    3. Mixed hyperlipidemia        Medical Decision Making  Complexity of problem: Chronic illness of diabetes mellitus uncontrolled, progressing  Data analyzed and reviewed: Reviewed prior notes, blood glucose data, labs including HgbA1c, lipids, serum chemistries.  Ordered tests.   Risk of complications and morbidities: Is definite because of " use of insulin and risk of hypoglycemia.  Prescription medications reviewed and modifications made.  Compliance assessed.  Addressed social determinants of health including food insecurity.    Plan   1. Type 1 diabetes mellitus with diabetic polyneuropathy (Primary)  - POCT glycosylated hemoglobin (Hb A1C) manually resulted  - OneTouch Ultra Test strip; Test blood sugar up to 5 times a day as directed.  Dispense: 150 strip; Refill: 5  - lancets (OneTouch UltraSoft 2 Lancet) 30 gauge misc; Test blood sugar up to 5 times a day as directed.  Dispense: 150 each; Refill: 5  - alcohol swabs (BD Alcohol Swabs) pads, medicated; Use before insulin injections and blood sugar testing up to 10 times a day.  Dispense: 300 each; Refill: 5  - insulin degludec (Tresiba FlexTouch U-100) 100 unit/mL (3 mL) pen; INJECT 120 UNITS UNDER THE SKIN ONCE DAILY AS DIRECTED  Dispense: 120 mL; Refill: 5  - insulin lispro (HumaLOG KwikPen Insulin) 100 unit/mL pen; INJECT 3 TIMES A DAY, UP  UNITS A DAY AS DIRECTED  Dispense: 45 mL; Refill: 5  - Albumin-Creatinine Ratio, Urine Random; Future  - CBC and Auto Differential; Future  - Comprehensive Metabolic Panel; Future  - Lipid Panel; Future    -A1c ordered and reviewed.   -CGM data downloaded and reviewed.   -Labs reviewed.     -A1c remains at target.   -Could not tolerate Ozempic d/t side effects - since stopped and reports increased appetite and weight gain.   -Pt adjusting insulin doses for slightly higher sugars.   -Will continue Tresiba 60u BID and Humalog 40u with meals.   -Refills sent to pharmacy for insulins and testing supplies.   -Due for labs - orders placed, complete before next visit.     2. Essential (primary) hypertension  -BP at target today.   -Continue current therapy.     3. Mixed hyperlipidemia  - Lipid Panel; Future    -Intolerant to statins.   -New on Zetia 10mg last visit; also taking Vascepa 0.5g per PCP.   -Recheck lipid panel before next visit - orders placed.    -If not at target, add fibrate.   -Can also consider PCSK9 if needed.         -labs/tests/notes reviewed  -reviewed and counseled patient on medication monitoring and side effects    Follow Up: 6 months BB    Treatment and plan discussed with Dr. Cristian Shen.   RUDOLPH Ballesteros, PharmD, BCACP, CDCES.

## 2025-03-11 NOTE — PATIENT INSTRUCTIONS
Your A1c was 6.6% today.     Continue Tresiba 60 units twice daily.     Continue Humalog 40 units with meals.     Bring your lancing pen and blood sugar meter to the pharmacy when you  the lancets and trest strips to make sure we ordered the right lancets and test strips.   -Call the office if you need a different prescription.     Get your lab work done any time before your next visit.     Follow up with Dr. Shen in 6 months.

## 2025-03-11 NOTE — PROGRESS NOTES
Attestation signed by Cristian Shen MD on 3/11/25 at 4:37 PM.    I, Dr Cristian Shen, have reviewed this progress note, medication list, vital signs, any pertinent lab values, and any CGM data if present with the [facility] and  face to face during this visit today. This note reflects the treatment plan that was made under my direction after reviewing the above mentioned elements while face to face with the patient and CDE.  I personally answered and addressed any questions and concerns the patient had during the visit today.  The CDE entered the data in this note under my direction and I personally reviewed it, signed any lab or medication orders that I instructed to be completed. I am the billing provider for this visit and the level of service was determined by my involvement in the Medical Decision Making Component of this visit while face to face with the patient.

## 2025-03-12 ENCOUNTER — TELEPHONE (OUTPATIENT)
Dept: ENDOCRINOLOGY | Facility: CLINIC | Age: 70
End: 2025-03-12
Payer: MEDICARE

## 2025-03-12 DIAGNOSIS — E10.42 TYPE 1 DIABETES MELLITUS WITH DIABETIC POLYNEUROPATHY: ICD-10-CM

## 2025-03-12 RX ORDER — BLOOD-GLUCOSE CONTROL, NORMAL
EACH MISCELLANEOUS
Qty: 500 EACH | Refills: 3 | Status: SHIPPED | OUTPATIENT
Start: 2025-03-12

## 2025-03-12 RX ORDER — BLOOD SUGAR DIAGNOSTIC
STRIP MISCELLANEOUS
Qty: 500 STRIP | Refills: 3 | Status: SHIPPED | OUTPATIENT
Start: 2025-03-12

## 2025-03-12 RX ORDER — ISOPROPYL ALCOHOL 70 ML/100ML
SWAB TOPICAL
Qty: 300 EACH | Refills: 5 | Status: SHIPPED | OUTPATIENT
Start: 2025-03-12

## 2025-03-14 DIAGNOSIS — E10.42 TYPE 1 DIABETES MELLITUS WITH DIABETIC POLYNEUROPATHY: Primary | ICD-10-CM

## 2025-03-14 RX ORDER — LANCETS 33 GAUGE
EACH MISCELLANEOUS
Qty: 500 EACH | Refills: 2 | Status: SHIPPED | OUTPATIENT
Start: 2025-03-14

## 2025-03-28 DIAGNOSIS — E10.42 TYPE 1 DIABETES MELLITUS WITH DIABETIC POLYNEUROPATHY: ICD-10-CM

## 2025-03-28 RX ORDER — BLOOD SUGAR DIAGNOSTIC
STRIP MISCELLANEOUS
Qty: 500 STRIP | Refills: 1 | Status: SHIPPED | OUTPATIENT
Start: 2025-03-28

## 2025-03-29 DIAGNOSIS — E10.42 TYPE 1 DIABETES MELLITUS WITH DIABETIC POLYNEUROPATHY: ICD-10-CM

## 2025-03-31 RX ORDER — BLOOD SUGAR DIAGNOSTIC
STRIP MISCELLANEOUS
Qty: 500 STRIP | Refills: 1 | Status: SHIPPED | OUTPATIENT
Start: 2025-03-31

## 2025-05-05 DIAGNOSIS — E78.2 MIXED HYPERLIPIDEMIA: ICD-10-CM

## 2025-05-05 RX ORDER — EZETIMIBE 10 MG/1
10 TABLET ORAL DAILY
Qty: 90 TABLET | Refills: 1 | Status: SHIPPED | OUTPATIENT
Start: 2025-05-05

## 2025-06-09 DIAGNOSIS — E10.42 TYPE 1 DIABETES MELLITUS WITH DIABETIC POLYNEUROPATHY: ICD-10-CM

## 2025-06-09 RX ORDER — INSULIN LISPRO 100 [IU]/ML
INJECTION, SOLUTION INTRAVENOUS; SUBCUTANEOUS
Qty: 150 ML | Refills: 1 | Status: SHIPPED | OUTPATIENT
Start: 2025-06-09

## 2025-08-15 DIAGNOSIS — E78.2 MIXED HYPERLIPIDEMIA: ICD-10-CM

## 2025-08-15 DIAGNOSIS — Z79.4 TYPE 2 DIABETES MELLITUS WITH OTHER SPECIFIED COMPLICATION, WITH LONG-TERM CURRENT USE OF INSULIN: ICD-10-CM

## 2025-08-15 DIAGNOSIS — E11.69 TYPE 2 DIABETES MELLITUS WITH OTHER SPECIFIED COMPLICATION, WITH LONG-TERM CURRENT USE OF INSULIN: ICD-10-CM

## 2025-08-15 RX ORDER — PEN NEEDLE, DIABETIC 30 GX3/16"
NEEDLE, DISPOSABLE MISCELLANEOUS
Qty: 500 EACH | Refills: 0 | Status: SHIPPED | OUTPATIENT
Start: 2025-08-15

## 2025-08-15 RX ORDER — EZETIMIBE 10 MG/1
10 TABLET ORAL DAILY
Qty: 90 TABLET | Refills: 1 | Status: SHIPPED | OUTPATIENT
Start: 2025-08-15

## 2025-09-11 ENCOUNTER — APPOINTMENT (OUTPATIENT)
Dept: ENDOCRINOLOGY | Facility: CLINIC | Age: 70
End: 2025-09-11
Payer: MEDICARE

## (undated) DEVICE — DRAPE, INSTRUMENT, W/POUCH, STERI DRAPE, 7 X 11 IN, DISPOSABLE, STERILE

## (undated) DEVICE — SUTURE, VICRYL, 3-0, 27 IN, SH

## (undated) DEVICE — SUTURE, MONOCRYL, 4-0, 18 IN, PS2, UNDYED

## (undated) DEVICE — DRAPE PACK, MINOR, CUSTOM, GEAUGA

## (undated) DEVICE — PROBE, TRUNODE GAMMA

## (undated) DEVICE — ELECTRODE, ELECTROSURGICAL, BLADE, INSULATED, ENT/IMA, STERILE

## (undated) DEVICE — SUTURE, ETHILON, 3-0, 18 IN, PS1, BLACK

## (undated) DEVICE — SUTURE, CHROMIC, 3-0, 27 IN, PS2, BROWN

## (undated) DEVICE — APPLIER,  LIGACLIP MULTI CLIP, 30 MED 11 1/2

## (undated) DEVICE — CAUTERY, PENCIL, PUSH BUTTON, SMOKE EVAC, 70MM

## (undated) DEVICE — SUTURE, SILK, 2-0, 30 IN, SH, BLACK

## (undated) DEVICE — DRAPE, SHEET, FAN FOLDED, HALF, 44 X 58 IN, DISPOSABLE, LF, STERILE

## (undated) DEVICE — SOLUTION, IRRIGATION, SODIUM CHLORIDE 0.9%, 1000 ML, POUR BOTTLE

## (undated) DEVICE — SUTURE, CTD, VICRYL, 2-0, UND, BR, CT-2

## (undated) DEVICE — DRAPE, TIBURON, SPLIT SHEET, REINF ADH STRIP, 77X122

## (undated) DEVICE — APPLICATOR, CHLORAPREP, W/ORANGE TINT, 26ML

## (undated) DEVICE — SUTURE, PDS II, 2-0, 27 IN, CT2, VIOLET

## (undated) DEVICE — SUTURE, PDS II, 5-0, 18 IN, P3, CLEAR